# Patient Record
Sex: FEMALE | Race: WHITE | NOT HISPANIC OR LATINO | Employment: OTHER | URBAN - METROPOLITAN AREA
[De-identification: names, ages, dates, MRNs, and addresses within clinical notes are randomized per-mention and may not be internally consistent; named-entity substitution may affect disease eponyms.]

---

## 2023-06-15 ENCOUNTER — OFFICE VISIT (OUTPATIENT)
Dept: PRIMARY CARE | Facility: CLINIC | Age: 43
End: 2023-06-15
Payer: COMMERCIAL

## 2023-06-15 ENCOUNTER — TELEPHONE (OUTPATIENT)
Dept: PRIMARY CARE | Facility: CLINIC | Age: 43
End: 2023-06-15

## 2023-06-15 VITALS
HEART RATE: 87 BPM | SYSTOLIC BLOOD PRESSURE: 100 MMHG | DIASTOLIC BLOOD PRESSURE: 73 MMHG | WEIGHT: 225.2 LBS | OXYGEN SATURATION: 99 % | BODY MASS INDEX: 34.13 KG/M2 | HEIGHT: 68 IN

## 2023-06-15 DIAGNOSIS — K64.9 HEMORRHOIDS, UNSPECIFIED HEMORRHOID TYPE: ICD-10-CM

## 2023-06-15 DIAGNOSIS — L71.9 ROSACEA: ICD-10-CM

## 2023-06-15 DIAGNOSIS — E03.9 HYPOTHYROIDISM, UNSPECIFIED TYPE: ICD-10-CM

## 2023-06-15 DIAGNOSIS — K58.1 IRRITABLE BOWEL SYNDROME WITH PREDOMINANT CONSTIPATION: Primary | ICD-10-CM

## 2023-06-15 DIAGNOSIS — K59.04 CHRONIC IDIOPATHIC CONSTIPATION: ICD-10-CM

## 2023-06-15 DIAGNOSIS — K58.1 IRRITABLE BOWEL SYNDROME WITH CONSTIPATION: ICD-10-CM

## 2023-06-15 DIAGNOSIS — Z12.31 ENCOUNTER FOR SCREENING MAMMOGRAM FOR MALIGNANT NEOPLASM OF BREAST: ICD-10-CM

## 2023-06-15 PROBLEM — B37.31 CANDIDIASIS OF VAGINA: Status: ACTIVE | Noted: 2023-06-15

## 2023-06-15 PROBLEM — M62.89 PELVIC FLOOR DYSFUNCTION: Status: ACTIVE | Noted: 2023-06-15

## 2023-06-15 PROBLEM — B00.9 HSV-2 (HERPES SIMPLEX VIRUS 2) INFECTION: Status: ACTIVE | Noted: 2023-06-15

## 2023-06-15 PROBLEM — L65.9 HAIR LOSS: Status: ACTIVE | Noted: 2023-06-15

## 2023-06-15 PROBLEM — R68.82 LOW LIBIDO: Status: ACTIVE | Noted: 2023-06-15

## 2023-06-15 PROBLEM — F52.0 HYPOACTIVE SEXUAL DESIRE DISORDER: Status: ACTIVE | Noted: 2023-06-15

## 2023-06-15 PROBLEM — R11.0 NAUSEA IN ADULT: Status: ACTIVE | Noted: 2023-06-15

## 2023-06-15 PROBLEM — G47.00 INSOMNIA: Status: ACTIVE | Noted: 2023-06-15

## 2023-06-15 PROBLEM — N80.9 ENDOMETRIOSIS: Status: ACTIVE | Noted: 2023-06-15

## 2023-06-15 PROBLEM — M54.2 CERVICALGIA: Status: ACTIVE | Noted: 2023-06-15

## 2023-06-15 PROBLEM — G43.711 INTRACTABLE CHRONIC MIGRAINE WITHOUT AURA AND WITH STATUS MIGRAINOSUS: Status: ACTIVE | Noted: 2023-06-15

## 2023-06-15 PROBLEM — E21.3 HYPERPARATHYROIDISM (MULTI): Status: ACTIVE | Noted: 2023-06-15

## 2023-06-15 PROBLEM — E78.2 MIXED HYPERLIPIDEMIA: Status: ACTIVE | Noted: 2023-06-15

## 2023-06-15 PROBLEM — N76.0 RECURRENT VAGINITIS: Status: ACTIVE | Noted: 2023-06-15

## 2023-06-15 PROBLEM — K76.0 FATTY LIVER: Status: ACTIVE | Noted: 2023-06-15

## 2023-06-15 PROBLEM — F60.3 BORDERLINE PERSONALITY DISORDER (MULTI): Status: ACTIVE | Noted: 2023-06-15

## 2023-06-15 PROBLEM — E55.9 VITAMIN D DEFICIENCY: Status: ACTIVE | Noted: 2023-06-15

## 2023-06-15 PROBLEM — K64.4 EXTERNAL HEMORRHOIDS: Status: ACTIVE | Noted: 2018-05-03

## 2023-06-15 PROCEDURE — 99214 OFFICE O/P EST MOD 30 MIN: CPT | Performed by: NURSE PRACTITIONER

## 2023-06-15 PROCEDURE — 1036F TOBACCO NON-USER: CPT | Performed by: NURSE PRACTITIONER

## 2023-06-15 RX ORDER — ZOLPIDEM TARTRATE 10 MG/1
10 TABLET ORAL NIGHTLY PRN
COMMUNITY

## 2023-06-15 RX ORDER — FLUCONAZOLE 150 MG/1
TABLET ORAL
COMMUNITY
End: 2023-09-18 | Stop reason: ALTCHOICE

## 2023-06-15 RX ORDER — ACETAMINOPHEN 325 MG/1
TABLET ORAL EVERY 6 HOURS
COMMUNITY
Start: 2021-08-15

## 2023-06-15 RX ORDER — SUMATRIPTAN 50 MG/1
TABLET, FILM COATED ORAL
COMMUNITY

## 2023-06-15 RX ORDER — TOPIRAMATE 100 MG/1
100 TABLET, FILM COATED ORAL 2 TIMES DAILY
COMMUNITY
End: 2024-01-22 | Stop reason: SDUPTHER

## 2023-06-15 RX ORDER — ACETAMINOPHEN, ASPIRIN (NSAID), AND CAFFEINE 250; 250; 65 MG/1; MG/1; MG/1
TABLET, FILM COATED ORAL EVERY 6 HOURS PRN
COMMUNITY

## 2023-06-15 RX ORDER — VALACYCLOVIR HYDROCHLORIDE 500 MG/1
TABLET, FILM COATED ORAL
COMMUNITY
End: 2024-04-02 | Stop reason: SDUPTHER

## 2023-06-15 RX ORDER — CLONAZEPAM 0.5 MG/1
0.5 TABLET ORAL 3 TIMES DAILY PRN
COMMUNITY

## 2023-06-15 RX ORDER — ACETAMINOPHEN 500 MG
1 TABLET ORAL DAILY
COMMUNITY
Start: 2022-02-07

## 2023-06-15 RX ORDER — SERTRALINE HYDROCHLORIDE 100 MG/1
100 TABLET, FILM COATED ORAL
COMMUNITY

## 2023-06-15 RX ORDER — LAMOTRIGINE 150 MG/1
TABLET ORAL
COMMUNITY
Start: 2023-06-05

## 2023-06-15 RX ORDER — MELATONIN 10 MG
CAPSULE ORAL
COMMUNITY

## 2023-06-15 RX ORDER — ONDANSETRON HYDROCHLORIDE 8 MG/1
TABLET, FILM COATED ORAL
COMMUNITY
Start: 2020-02-17

## 2023-06-15 RX ORDER — TIZANIDINE 4 MG/1
4 TABLET ORAL NIGHTLY
COMMUNITY
End: 2023-11-28

## 2023-06-15 RX ORDER — LURASIDONE HYDROCHLORIDE 40 MG/1
TABLET, FILM COATED ORAL
COMMUNITY
Start: 2023-06-07

## 2023-06-15 RX ORDER — LEVOTHYROXINE SODIUM 50 UG/1
50 TABLET ORAL DAILY
COMMUNITY
End: 2023-06-15 | Stop reason: SDUPTHER

## 2023-06-15 RX ORDER — LEVOTHYROXINE SODIUM 50 UG/1
50 TABLET ORAL DAILY
Qty: 90 TABLET | Refills: 1 | Status: SHIPPED | OUTPATIENT
Start: 2023-06-15 | End: 2023-12-04

## 2023-06-15 NOTE — PROGRESS NOTES
"Subjective   Patient ID: Flower Guevara is a 42 y.o. female who presents for chronic care (Hemorrhoids binding?  /Rosacea.  ).    HPI     She did not have any improvement with her rosacea with metronidazole cream and brimonide was not covered by her insurance. She would like to see a dermatologist.     She had a hemorrhoidectomy in 2020 which helped, however, her hemorrhoids have returned. She reports occasional pain and rectal bleeding when she is constipated, which is frequent. She takes Linzess but only every couple days because it works \"too well\" and causes diarrhea. She has been taking a prebiotic and probiotic with little improvement. Has taken colace with little improvement.     She would like to see Dr. Bharti Melara (colorectal surgery) for her hemorrhoids for possible banding.         Review of Systems  ROS negative except as noted above in HPI.       Objective   /73   Pulse 87   Ht 1.727 m (5' 8\")   Wt 102 kg (225 lb 3.2 oz)   SpO2 99%   BMI 34.24 kg/m²     Physical Exam  General: Alert and oriented, in no acute distress. Appears stated age, well-nourished, and well hydrated  HEENT:  - Head: Normocephalic and atraumatic   - Eyes: EOMI, PERRLA  - ENT: Hearing grossly intact  Heart: RRR. No murmurs, clicks, or rubs  Lungs: Unlabored breathing. CTAB with no crackles, wheezes, or rhonchi  Abdomen: Normal BS in all 4 quadrants. Soft, non-tender, non-distended, with no masses  Extremities: Warm and well perfused. No edema. Normal peripheral pulses  Musculoskeletal: Normal gait and station  Neurological: Alert and oriented. No gross neurological deficits  Psychological: Appropriate mood and affect  Skin: No rash, abnormal lesions, cyanosis, or erythema    Assessment/Plan   Rosacea  - No improvement with metronidazole cream, brimonide not covered by insurance   - Referral placed for dermatology    Hemorrhoids  - Referral placed for colorectal surgery for possible banding    IBS-C  - Linzess causes " constipation  - Prescription sent for Trulance 3 mg every day (also given samples)  - Increase dietary fiber intake    Breast CA screening  - Mammogram ordered    RTC in 3-4 months or sooner prn    Reviewed and approved by PAZ MILLS on 6/15/23 at 2:57 PM.

## 2023-06-15 NOTE — TELEPHONE ENCOUNTER
Pt was started on trulance and the pharmacy told her it needed a PA- do you want to PA or change?

## 2023-06-20 ENCOUNTER — TELEPHONE (OUTPATIENT)
Dept: PRIMARY CARE | Facility: CLINIC | Age: 43
End: 2023-06-20
Payer: COMMERCIAL

## 2023-07-07 DIAGNOSIS — N63.10 MASS OF RIGHT BREAST, UNSPECIFIED QUADRANT: Primary | ICD-10-CM

## 2023-08-23 LAB
CLUE CELLS: NORMAL
NUGENT SCORE: 1
YEAST: NORMAL

## 2023-09-18 PROBLEM — M79.18 MYALGIA, OTHER SITE: Status: ACTIVE | Noted: 2023-09-18

## 2023-09-18 PROBLEM — R19.8 ABNORMAL BOWEL MOVEMENT: Status: ACTIVE | Noted: 2023-09-18

## 2023-09-18 PROBLEM — L53.9 ERYTHEMA OF VAGINA: Status: ACTIVE | Noted: 2023-09-18

## 2023-09-18 PROBLEM — E34.9 HORMONE IMBALANCE: Status: ACTIVE | Noted: 2023-09-18

## 2023-09-18 PROBLEM — R53.83 FATIGUE: Status: ACTIVE | Noted: 2023-09-18

## 2023-09-18 PROBLEM — H93.13 TINNITUS OF BOTH EARS: Status: ACTIVE | Noted: 2023-09-18

## 2023-09-18 PROBLEM — R07.1 INSPIRATORY PAIN: Status: ACTIVE | Noted: 2023-09-18

## 2023-09-18 PROBLEM — R74.01 TRANSAMINITIS: Status: ACTIVE | Noted: 2023-09-18

## 2023-09-18 PROBLEM — H92.09 OTALGIA: Status: ACTIVE | Noted: 2023-09-18

## 2023-09-18 PROBLEM — M99.09 SEGMENTAL AND SOMATIC DYSFUNCTION: Status: ACTIVE | Noted: 2023-09-18

## 2023-09-18 RX ORDER — TOBRAMYCIN AND DEXAMETHASONE 3; 1 MG/ML; MG/ML
SUSPENSION/ DROPS OPHTHALMIC
COMMUNITY
Start: 2023-07-18 | End: 2024-01-10 | Stop reason: ALTCHOICE

## 2023-09-18 RX ORDER — DEXAMETHASONE 2 MG/1
TABLET ORAL
COMMUNITY
Start: 2020-05-15 | End: 2024-01-10 | Stop reason: ALTCHOICE

## 2023-09-18 RX ORDER — BRIMONIDINE 5 MG/G
GEL TOPICAL
COMMUNITY
Start: 2023-02-16

## 2023-09-21 ENCOUNTER — OFFICE VISIT (OUTPATIENT)
Dept: PRIMARY CARE | Facility: CLINIC | Age: 43
End: 2023-09-21
Payer: COMMERCIAL

## 2023-09-21 VITALS
HEART RATE: 72 BPM | WEIGHT: 229.4 LBS | DIASTOLIC BLOOD PRESSURE: 76 MMHG | SYSTOLIC BLOOD PRESSURE: 108 MMHG | OXYGEN SATURATION: 99 % | BODY MASS INDEX: 34.88 KG/M2

## 2023-09-21 DIAGNOSIS — M77.11 LATERAL EPICONDYLITIS OF RIGHT ELBOW: Primary | ICD-10-CM

## 2023-09-21 PROBLEM — L53.9 ERYTHEMA OF VAGINA: Status: RESOLVED | Noted: 2023-09-18 | Resolved: 2023-09-21

## 2023-09-21 PROBLEM — M79.18 MYALGIA, OTHER SITE: Status: RESOLVED | Noted: 2023-09-18 | Resolved: 2023-09-21

## 2023-09-21 PROBLEM — B37.31 CANDIDIASIS OF VAGINA: Status: RESOLVED | Noted: 2023-06-15 | Resolved: 2023-09-21

## 2023-09-21 PROBLEM — H92.09 OTALGIA: Status: RESOLVED | Noted: 2023-09-18 | Resolved: 2023-09-21

## 2023-09-21 PROBLEM — R07.1 INSPIRATORY PAIN: Status: RESOLVED | Noted: 2023-09-18 | Resolved: 2023-09-21

## 2023-09-21 PROBLEM — R19.8 ABNORMAL BOWEL MOVEMENT: Status: RESOLVED | Noted: 2023-09-18 | Resolved: 2023-09-21

## 2023-09-21 PROCEDURE — 99213 OFFICE O/P EST LOW 20 MIN: CPT | Performed by: NURSE PRACTITIONER

## 2023-09-21 PROCEDURE — 1036F TOBACCO NON-USER: CPT | Performed by: NURSE PRACTITIONER

## 2023-09-21 RX ORDER — MELOXICAM 15 MG/1
15 TABLET ORAL DAILY
Qty: 7 TABLET | Refills: 0 | Status: SHIPPED | OUTPATIENT
Start: 2023-09-21 | End: 2023-09-28

## 2023-09-21 NOTE — PATIENT INSTRUCTIONS
Lateral Epicondylitis Exercises    About this topic  The elbow is where your upper arm bone meets the two lower bones in your arm. There is a bump on the outside of your elbow at the bottom of your upper arm bone. It is the lateral epicondyle. A few tendons attach here. Tendons attach muscles to bone. These muscles are used to pull your wrist and fingers up.  When these tendons get sore and swollen from overuse, you have lateral epicondylitis. Is also called tennis elbow. This is a common problem in tennis players. It may also happen with other activities or sports. You are more likely to have this problem in the arm you use most, but it can happen in either arm. Exercises can help to make this problem better.  General  Before starting with a program, ask your doctor if you are healthy enough to do these exercises. Your doctor may have you work with a  or physical therapist to make a safe exercise program to meet your needs.  Stretching Exercises  Stretching exercises keep your muscles flexible. They also stop them from getting tight. Start by doing each of these stretches 2 to 3 times. In order for your body to make changes, you will need to hold these stretches for 20 to 30 seconds. Try to do the stretches 2 to 3 times each day. Do all exercises slowly.  Wrist stretches bending down ? Straighten your elbow and have your palm facing down. Keeping your sore elbow straight, bend your hand and fingers down so that your fingers are now pointing to the floor. Grab your hand with your other hand and push back the wrist further until you feel a stretch.  Strengthening Exercises  Strengthening exercises keep your muscles firm and strong. Start by repeating each exercise 2 to 3 times. Work up to doing each exercise 10 times. Try to do the exercises 2 to 3 times each day. Do all exercises slowly.  Some exercises can be done holding a small weight. You can use a can of soup or a hand weight. If the exercise gets too  easy, use heavier weights or do the exercise more times.  Wrist exercises seated with your lower arm supported on a table or your leg. Your hand should be off the edge:  Bending up ? Have your palm facing UP with a small weight in your hand. Bend your wrist up as far as you can. Now, lower the weight back down. Be sure to control the weight as you lower it. Repeat using other hand.  Bending down ? Have your palm facing DOWN with a small weight in your hand. Bend your wrist back as far as you can. Now, lower the weight back down. Be sure to control the weight as you lower it. Repeat using other hand.  Side-to-side ? Position your hand SIDEWAYS with your thumb up. With a weight in your hand, lift your hand straight up. Now, lower your hand back down. Repeat using other hand.  Lower arm twists with weight ? Start by having your elbow bent with your arm at your side. Hold a small weight in your hand. Keeping your arm at your side and not moving your elbow, turn the lower arm until your palm is facing down. Now, turn the lower arm until your palm is facing up. Repeat using other hand.  Finger spreads with rubber band ? Find a thick rubber band. Straighten your fingers and bring them together so they are touching each other. Place the rubber band around your fingers and thumb as close to the nails as possible. Spread your fingers out as far as you can. Then, slowly bring them back to the starting position.  Ball squeezes ? Gently squeeze a tennis ball 10 times. If you have no pain, squeeze with more pressure.  Tennis strokes with exercise band ? Once your pain has lessened and you are almost ready to return to the tennis court, try these 3 exercises. You will need to exercise near a door or closet that can be opened and closed easily. Start with a thinner band and work your way up to a thicker band. These band exercises can help you with three tennis strokes:  Cora ? Secure an exercise band in a door at waist level.  Stand sideways with the hand you use the closest to the door. Grab the band with that hand. Pull the band across your body like you do in a candelaria motion.  Backhand ? Secure an exercise band in a door at waist level. Stand sideways with the hand you use the most away from the door. Reach toward the door and grab the band with this hand. Now, pull the band across your body like you do in a backhand motion.  Serve ? Secure an exercise band in a door at shoulder level. Stand facing away from the door. Grab the band with the hand you use the most. Start with your hand up and behind your head like you would for the start of a serve. Pull the band up and over like you are doing a serving motion.  Your doctor or therapist may also have you use a rubber bar or Flex bar for exercises to help your lateral epicondylitis.    What will the results be?  Less pain and swelling  Increased strength  Better range of motion  Greater ease doing arm activities  Helpful tips  Stay active and work out to keep your muscles strong and flexible.  Keep a healthy weight to avoid putting too much stress on your joints. Eat a healthy diet to keep your muscles healthy.  Be sure you do not hold your breath when exercising. This can raise your blood pressure. If you tend to hold your breath, try counting out loud when exercising. If any exercise bothers you, stop right away.  Always warm up before stretching. Heated muscles stretch much easier than cool muscles. Stretching cool muscles can lead to injury.  Try walking and swinging your arms at an easy pace for a few minutes to warm up your muscles. Do this again after exercising.  Never bounce when doing stretches.  Doing exercises before a meal may be a good way to get into a routine.  If you are using weights, choose a weight that will allow you to repeat the exercise 10 times before resting. If you easily do 10 repeats, you may not be using enough weight. If you are not able to do 10 repeats,  you are using too heavy of a weight.  Exercise may be slightly uncomfortable, but you should not have sharp pains. If you do get sharp pains, stop what you are doing. If the sharp pains continue, call your doctor.  Where can I learn more?  [employer]  https://orthoinfo.aaos.org/en/recovery/epicondylitis-therapeutic-exercise-program/  Last Reviewed Date  2021-08-03

## 2023-09-21 NOTE — PROGRESS NOTES
Subjective   Patient ID: Flower Guevara is a 43 y.o. female who presents for Arm Pain (Pt was seen in ER for R arm pain. Xray was completed. Pt stated in happened in July.).    HPI     [place] and was using a paddle. She developed pain in her right elbow at some point after that. She cannot remember exactly how soon after. The pain has been present since. She even went to the ED on 9/9 as the pain had gotten so bad. X-ray was done which was negative. Describes the pain as a sharp ache, worse with certain movements such as gripping things, brushing her hair, or opening a jar. She is left-handed but does do a lot of things with her right hand. She has tried ibuprofen, heat and ice application, wearing a counter force brace, and wearing a compression sleeve without relief.     Review of Systems  ROS negative except as noted above in HPI.       Objective   /76   Pulse 72   Wt 104 kg (229 lb 6.4 oz)   SpO2 99%   BMI 34.88 kg/m²     Physical Exam  General: Alert and oriented, in no acute distress. Appears stated age, well-nourished, and well hydrated  HEENT:  - Head: Normocephalic and atraumatic   - Eyes: EOMI, PERRLA  - ENT: Hearing grossly intact  Heart: RRR. No murmurs, clicks, or rubs  Lungs: Unlabored breathing. CTAB with no crackles, wheezes, or rhonchi  Abdomen: Normal BS in all 4 quadrants. Soft, non-tender, non-distended, with no masses  Extremities: Warm and well perfused. No edema. Normal peripheral pulses  Musculoskeletal: Localized tenderness over right lateral epicondyle. Pain with resisted wrist extension and pain with passive terminal wrist flexion. Normal gait and station  Neurological: Alert and oriented. No gross neurological deficits  Psychological: Appropriate mood and affect  Skin: No rash, abnormal lesions, cyanosis, or erythema      Assessment/Plan   Right lateral epicondylitis  - Rx meloxicam 15 mg every day for 7 days  - Recommend wearing  counter force brace  - Recommend icing 3-4 times per day  - Provided handout on exercises/stretched  - Consider orthopedics referral if no improvement    RTC as scheduled on 10/5/23    WINIFRED Petty-CNP  [facility]

## 2023-09-29 DIAGNOSIS — M77.11 LATERAL EPICONDYLITIS OF RIGHT ELBOW: Primary | ICD-10-CM

## 2023-10-01 DIAGNOSIS — K58.1 IRRITABLE BOWEL SYNDROME WITH PREDOMINANT CONSTIPATION: ICD-10-CM

## 2023-10-03 RX ORDER — PLECANATIDE 3 MG/1
TABLET ORAL DAILY
Qty: 90 TABLET | Refills: 0 | Status: SHIPPED | OUTPATIENT
Start: 2023-10-03 | End: 2023-12-22

## 2023-10-05 ENCOUNTER — OFFICE VISIT (OUTPATIENT)
Dept: ORTHOPEDIC SURGERY | Facility: HOSPITAL | Age: 43
End: 2023-10-05
Payer: COMMERCIAL

## 2023-10-05 ENCOUNTER — APPOINTMENT (OUTPATIENT)
Dept: PRIMARY CARE | Facility: CLINIC | Age: 43
End: 2023-10-05
Payer: COMMERCIAL

## 2023-10-05 DIAGNOSIS — M77.11 LATERAL EPICONDYLITIS OF RIGHT ELBOW: Primary | ICD-10-CM

## 2023-10-05 DIAGNOSIS — M25.521 ELBOW PAIN, RIGHT: ICD-10-CM

## 2023-10-05 PROCEDURE — 20551 NJX 1 TENDON ORIGIN/INSJ: CPT | Performed by: ORTHOPAEDIC SURGERY

## 2023-10-05 PROCEDURE — 1036F TOBACCO NON-USER: CPT | Performed by: ORTHOPAEDIC SURGERY

## 2023-10-05 PROCEDURE — 99213 OFFICE O/P EST LOW 20 MIN: CPT | Mod: 25 | Performed by: ORTHOPAEDIC SURGERY

## 2023-10-05 PROCEDURE — 99203 OFFICE O/P NEW LOW 30 MIN: CPT | Performed by: ORTHOPAEDIC SURGERY

## 2023-10-05 PROCEDURE — 20605 DRAIN/INJ JOINT/BURSA W/O US: CPT | Performed by: ORTHOPAEDIC SURGERY

## 2023-10-05 NOTE — LETTER
October 5, 2023     WINIFRED Stock-CNP   [address]  [facility] [address]    Patient: Flower Guevara   YOB: 1980   Date of Visit: 10/5/2023       Dear Dr. Kayli Camejo, WINIFRED-CNP:    Thank you for referring Flower Guevara to me for evaluation. Below are my notes for this consultation.  If you have questions, please do not hesitate to call me. I look forward to following your patient along with you.       Sincerely,     Nj Hurst MD      CC: No Recipients  ______________________________________________________________________________________    43-year-old is seen with lateral sided right elbow pain.  She has been having persistent severe sharp shooting pain since July.  She was doing repetitive paddling and noted the pain.  She has used meloxicam without relief.  She is done stretching exercises and use a tennis elbow band.  She is on disability for mental health related issues.  She is left-hand dominant.    Pleasant in no acute distress.  Right elbow range of motion is 5 to 130 degrees.  There is no effusion or instability.  There is tenderness over the lateral epicondyle.  There is no medial tenderness.  Mild tenderness along the left elbow lateral epicondyle.    Multiple x-ray views of the right elbow are personally reviewed and there is no acute bony abnormality.  No significant degenerative changes.    A detailed discussion about lateral epicondylitis was done.  Treatment options were reviewed and the decision was made to proceed with cortisone injection.  Under sterile conditions the left elbow lateral epicondyle was injected with 1-1/2 cc of Xylocaine and 1 cc of Kenalog 40 mg/cc.  She will perform formal physical therapy.  If she has persistent symptoms MRI could be obtained and she could potentially be Tenex candidate.

## 2023-10-05 NOTE — PROGRESS NOTES
43-year-old is seen with lateral sided right elbow pain.  She has been having persistent severe sharp shooting pain since July.  She was doing repetitive paddling and noted the pain.  She has used meloxicam without relief.  She is done stretching exercises and use a tennis elbow band.  She is on disability for mental health related issues.  She is left-hand dominant.    Pleasant in no acute distress.  Right elbow range of motion is 5 to 130 degrees.  There is no effusion or instability.  There is tenderness over the lateral epicondyle.  There is no medial tenderness.  Mild tenderness along the left elbow lateral epicondyle.    Multiple x-ray views of the right elbow are personally reviewed and there is no acute bony abnormality.  No significant degenerative changes.    A detailed discussion about lateral epicondylitis was done.  Treatment options were reviewed and the decision was made to proceed with cortisone injection.  Under sterile conditions the left elbow lateral epicondyle was injected with 1-1/2 cc of Xylocaine and 1 cc of Kenalog 40 mg/cc.  She will perform formal physical therapy.  If she has persistent symptoms MRI could be obtained and she could potentially be Tenex candidate.

## 2023-10-16 ENCOUNTER — HOSPITAL ENCOUNTER (EMERGENCY)
Facility: HOSPITAL | Age: 43
Discharge: LEFT BEFORE DISCHARGE | End: 2023-10-17
Attending: STUDENT IN AN ORGANIZED HEALTH CARE EDUCATION/TRAINING PROGRAM
Payer: COMMERCIAL

## 2023-10-16 VITALS
BODY MASS INDEX: 34.86 KG/M2 | DIASTOLIC BLOOD PRESSURE: 80 MMHG | HEIGHT: 68 IN | OXYGEN SATURATION: 97 % | SYSTOLIC BLOOD PRESSURE: 115 MMHG | RESPIRATION RATE: 20 BRPM | WEIGHT: 230 LBS | HEART RATE: 69 BPM | TEMPERATURE: 98.5 F

## 2023-10-16 DIAGNOSIS — R10.84 GENERALIZED ABDOMINAL PAIN: ICD-10-CM

## 2023-10-16 DIAGNOSIS — R51.9 NONINTRACTABLE HEADACHE, UNSPECIFIED CHRONICITY PATTERN, UNSPECIFIED HEADACHE TYPE: Primary | ICD-10-CM

## 2023-10-16 LAB
ALBUMIN SERPL BCP-MCNC: 4.6 G/DL (ref 3.4–5)
ALP SERPL-CCNC: 99 U/L (ref 33–110)
ALT SERPL W P-5'-P-CCNC: 19 U/L (ref 7–45)
ANION GAP SERPL CALC-SCNC: 13 MMOL/L (ref 10–20)
APPEARANCE UR: CLEAR
AST SERPL W P-5'-P-CCNC: 20 U/L (ref 9–39)
B-HCG SERPL-ACNC: <2 MIU/ML
BASOPHILS # BLD AUTO: 0.09 X10*3/UL (ref 0–0.1)
BASOPHILS NFR BLD AUTO: 0.8 %
BILIRUB SERPL-MCNC: 0.5 MG/DL (ref 0–1.2)
BILIRUB UR STRIP.AUTO-MCNC: NEGATIVE MG/DL
BUN SERPL-MCNC: 13 MG/DL (ref 6–23)
CALCIUM SERPL-MCNC: 9.4 MG/DL (ref 8.6–10.3)
CHLORIDE SERPL-SCNC: 103 MMOL/L (ref 98–107)
CO2 SERPL-SCNC: 25 MMOL/L (ref 21–32)
COLOR UR: YELLOW
CREAT SERPL-MCNC: 0.85 MG/DL (ref 0.5–1.05)
EOSINOPHIL # BLD AUTO: 0.22 X10*3/UL (ref 0–0.7)
EOSINOPHIL NFR BLD AUTO: 2 %
ERYTHROCYTE [DISTWIDTH] IN BLOOD BY AUTOMATED COUNT: 12.4 % (ref 11.5–14.5)
ERYTHROCYTE [SEDIMENTATION RATE] IN BLOOD BY WESTERGREN METHOD: 36 MM/H (ref 0–20)
GFR SERPL CREATININE-BSD FRML MDRD: 87 ML/MIN/1.73M*2
GLUCOSE SERPL-MCNC: 86 MG/DL (ref 74–99)
GLUCOSE UR STRIP.AUTO-MCNC: NEGATIVE MG/DL
HCT VFR BLD AUTO: 46.4 % (ref 36–46)
HGB BLD-MCNC: 15.8 G/DL (ref 12–16)
IMM GRANULOCYTES # BLD AUTO: 0.05 X10*3/UL (ref 0–0.7)
IMM GRANULOCYTES NFR BLD AUTO: 0.5 % (ref 0–0.9)
KETONES UR STRIP.AUTO-MCNC: NEGATIVE MG/DL
LACTATE SERPL-SCNC: 1 MMOL/L (ref 0.4–2)
LEUKOCYTE ESTERASE UR QL STRIP.AUTO: NEGATIVE
LIPASE SERPL-CCNC: 18 U/L (ref 9–82)
LYMPHOCYTES # BLD AUTO: 2.75 X10*3/UL (ref 1.2–4.8)
LYMPHOCYTES NFR BLD AUTO: 24.9 %
MCH RBC QN AUTO: 33.3 PG (ref 26–34)
MCHC RBC AUTO-ENTMCNC: 34.1 G/DL (ref 32–36)
MCV RBC AUTO: 98 FL (ref 80–100)
MONOCYTES # BLD AUTO: 0.73 X10*3/UL (ref 0.1–1)
MONOCYTES NFR BLD AUTO: 6.6 %
NEUTROPHILS # BLD AUTO: 7.2 X10*3/UL (ref 1.2–7.7)
NEUTROPHILS NFR BLD AUTO: 65.2 %
NITRITE UR QL STRIP.AUTO: NEGATIVE
NRBC BLD-RTO: 0 /100 WBCS (ref 0–0)
PH UR STRIP.AUTO: 5 [PH]
PLATELET # BLD AUTO: 342 X10*3/UL (ref 150–450)
PMV BLD AUTO: 9.5 FL (ref 7.5–11.5)
POTASSIUM SERPL-SCNC: 4.3 MMOL/L (ref 3.5–5.3)
PROT SERPL-MCNC: 8.2 G/DL (ref 6.4–8.2)
PROT UR STRIP.AUTO-MCNC: NEGATIVE MG/DL
RBC # BLD AUTO: 4.74 X10*6/UL (ref 4–5.2)
RBC # UR STRIP.AUTO: NEGATIVE /UL
SODIUM SERPL-SCNC: 137 MMOL/L (ref 136–145)
SP GR UR STRIP.AUTO: 1.02
UROBILINOGEN UR STRIP.AUTO-MCNC: <2 MG/DL
WBC # BLD AUTO: 11 X10*3/UL (ref 4.4–11.3)

## 2023-10-16 PROCEDURE — 81003 URINALYSIS AUTO W/O SCOPE: CPT | Performed by: EMERGENCY MEDICINE

## 2023-10-16 PROCEDURE — 96375 TX/PRO/DX INJ NEW DRUG ADDON: CPT

## 2023-10-16 PROCEDURE — 36415 COLL VENOUS BLD VENIPUNCTURE: CPT | Performed by: EMERGENCY MEDICINE

## 2023-10-16 PROCEDURE — 83690 ASSAY OF LIPASE: CPT

## 2023-10-16 PROCEDURE — 2500000004 HC RX 250 GENERAL PHARMACY W/ HCPCS (ALT 636 FOR OP/ED): Performed by: PHYSICIAN ASSISTANT

## 2023-10-16 PROCEDURE — 84702 CHORIONIC GONADOTROPIN TEST: CPT

## 2023-10-16 PROCEDURE — 85652 RBC SED RATE AUTOMATED: CPT

## 2023-10-16 PROCEDURE — 85025 COMPLETE CBC W/AUTO DIFF WBC: CPT | Performed by: EMERGENCY MEDICINE

## 2023-10-16 PROCEDURE — 83605 ASSAY OF LACTIC ACID: CPT | Performed by: EMERGENCY MEDICINE

## 2023-10-16 PROCEDURE — 99284 EMERGENCY DEPT VISIT MOD MDM: CPT | Mod: 25

## 2023-10-16 PROCEDURE — 74019 RADEX ABDOMEN 2 VIEWS: CPT | Mod: FOREIGN READ | Performed by: RADIOLOGY

## 2023-10-16 PROCEDURE — 2500000004 HC RX 250 GENERAL PHARMACY W/ HCPCS (ALT 636 FOR OP/ED)

## 2023-10-16 PROCEDURE — 96374 THER/PROPH/DIAG INJ IV PUSH: CPT

## 2023-10-16 PROCEDURE — 80053 COMPREHEN METABOLIC PANEL: CPT | Performed by: EMERGENCY MEDICINE

## 2023-10-16 RX ORDER — METOCLOPRAMIDE HYDROCHLORIDE 5 MG/ML
10 INJECTION INTRAMUSCULAR; INTRAVENOUS ONCE
Status: COMPLETED | OUTPATIENT
Start: 2023-10-16 | End: 2023-10-16

## 2023-10-16 RX ORDER — DOCUSATE SODIUM 100 MG/1
100 CAPSULE, LIQUID FILLED ORAL ONCE
Status: DISCONTINUED | OUTPATIENT
Start: 2023-10-16 | End: 2023-10-17 | Stop reason: HOSPADM

## 2023-10-16 RX ORDER — ONDANSETRON HYDROCHLORIDE 2 MG/ML
4 INJECTION, SOLUTION INTRAVENOUS ONCE
Status: COMPLETED | OUTPATIENT
Start: 2023-10-16 | End: 2023-10-16

## 2023-10-16 RX ADMIN — ONDANSETRON 4 MG: 2 INJECTION INTRAMUSCULAR; INTRAVENOUS at 21:38

## 2023-10-16 RX ADMIN — METOCLOPRAMIDE 10 MG: 5 INJECTION, SOLUTION INTRAMUSCULAR; INTRAVENOUS at 22:40

## 2023-10-16 ASSESSMENT — PAIN - FUNCTIONAL ASSESSMENT: PAIN_FUNCTIONAL_ASSESSMENT: 0-10

## 2023-10-16 ASSESSMENT — PAIN SCALES - GENERAL
PAINLEVEL_OUTOF10: 8
PAINLEVEL_OUTOF10: 10 - WORST POSSIBLE PAIN
PAINLEVEL_OUTOF10: 10 - WORST POSSIBLE PAIN

## 2023-10-16 ASSESSMENT — PAIN DESCRIPTION - LOCATION
LOCATION: ABDOMEN
LOCATION_2: HEAD

## 2023-10-16 ASSESSMENT — PAIN DESCRIPTION - DESCRIPTORS
DESCRIPTORS_2: ACHING
DESCRIPTORS: CRAMPING
DESCRIPTORS: CRAMPING

## 2023-10-17 NOTE — ED PROVIDER NOTES
HPI   Chief Complaint   Patient presents with    Abdominal Pain     Constipation and migraine       43-year-old female with past medical history of borderline personality disorder, depression, hyperparathyroidism, insomnia, anemia, RLS, migraines, thyroid disease presents the ED today for chief concern of abdominal pain.  Patient states that this started about 3 days ago.  She states that she has not had a bowel movement in 3 days.  Describes abdominal as diffuse and crampy.  It is located diffusely throughout the abdomen more so on her right side without any radiation.  She states that she has been trying Fleet enemas and MiraLAX at home with no relief in her symptoms.  She states that about an hour ago she developed a gradual onset right-sided headache.  States that she has photophobia and phonophobia as well.  She is not on oral contraceptive pills.  Is not the worst headache of her life.  She states that she has not taken any medicines for headache at home.  She states he has history of migraines and feels of her symptoms are similar.  She states that she has also had a hemorrhoidectomy in the past.  Endorses burning in the rectal area and bright red blood per stool.  She denies any fever/chills, nausea/vomiting, diarrhea, imbalance.  States she had similar symptoms in the past after she had her daughter.  She states that she is in the ER for rectal impaction at that time but she left without treatment being completed.  She has no other symptoms or concerns at this time.      History provided by:  Patient   used: No                        [place] Coma Scale Score: 15                  Patient History   Past Medical History:   Diagnosis Date    Borderline personality disorder (CMS/[facility]) 07/13/2020    Borderline personality disorder    Depression, unspecified 11/15/2022    Depression, unspecified depression type    Hyperparathyroidism, unspecified (CMS/[facility]) 11/06/2019    Hyperparathyroidism     Insomnia, unspecified 11/06/2019    Insomnia, unspecified type    Other specified personal risk factors, not elsewhere classified 11/06/2019    History of drug overdose    Personal history of diseases of the blood and blood-forming organs and certain disorders involving the immune mechanism     History of anemia    Personal history of other diseases of the digestive system 11/06/2019    History of irritable bowel syndrome    Personal history of other diseases of the digestive system 11/06/2019    History of hemorrhoids    Personal history of other diseases of the digestive system 11/06/2019    History of fatty infiltration of liver    Personal history of other diseases of the nervous system and sense organs 11/06/2019    History of restless legs syndrome    Personal history of other diseases of the nervous system and sense organs 11/06/2019    History of migraine    Personal history of other endocrine, nutritional and metabolic disease 11/06/2019    History of thyroid disease    Personal history of other infectious and parasitic diseases 11/07/2019    History of Helicobacter pylori infection    Personal history of other mental and behavioral disorders 11/06/2019    History of anxiety    Personal history of other mental and behavioral disorders 11/06/2019    History of suicidal ideation    Personal history of other mental and behavioral disorders     History of mental disorder    Personal history of other specified conditions     History of tachycardia    Personal history of other specified conditions 11/06/2019    History of headache    Personal history of other specified conditions     History of snoring     Past Surgical History:   Procedure Laterality Date    OTHER SURGICAL HISTORY  06/18/2019    Hysterectomy    OTHER SURGICAL HISTORY  01/12/2021    Hemorrhoidectomy    OTHER SURGICAL HISTORY  05/15/2020    Tonsillectomy    OTHER SURGICAL HISTORY  01/24/2020    Endoscopy     Family History   Problem Relation Name  Age of Onset    Anxiety disorder Mother      Depression Mother      Other (kidney stones) Mother      Anxiety disorder Father      Depression Father      Hypertension Father      Schizophrenia Other       Social History     Tobacco Use    Smoking status: Never    Smokeless tobacco: Never   Substance Use Topics    Alcohol use: Not Currently    Drug use: Never       Physical Exam   ED Triage Vitals   Temp Heart Rate Resp BP   10/16/23 2029 10/16/23 2029 10/16/23 2029 10/16/23 2029   36.9 °C (98.5 °F) 85 20 (!) 140/98      SpO2 Temp Source Heart Rate Source Patient Position   10/16/23 2029 10/16/23 2029 10/16/23 2339 10/16/23 2339   100 % Oral Monitor Lying      BP Location FiO2 (%)     10/16/23 2339 --     Right arm        Physical Exam  General: The pain the patient is sitting comfortably no acute distress.  Vital signs per nursing note.  Skin: No rashes, lesions, scars.  Normal skin turgor.  HEENT: The head is atraumatic normocephalic.  The neck is supple.  The trachea is midline.  No JVD.  5/5 strength in the neck.   No tenderness to palpation of the head.  Eyelashes and eyebrows are of normal quantity, distribution, color, and position bilaterally without lesions.  No enophthalmos or exophthalmos.  PERRLA, EOMI without nystagmus.  Negative raccoon eyes. External ear anatomy is normal.  TMs are white/gray and translucent.  Light reflex and bony landmarks are present.  No erythema, bulging, or retraction of the TM.  Negative cui sign.  Hearing is grossly intact.  No epistaxis or rhinorrhea.  Lips and buccal mucosa are pink and moist without lesions.  Tongue is midline without lesions.  Uvula is midline with symmetric elevation of the soft palate.  Normal phonation.  No hoarseness.  No muffled voice.  No tenderness over the temporal artery.  Lungs: Lungs are clear to auscultation bilaterally.  No rhonchi, wheezing, or rales.  No stridor.  Symmetric chest expansion  Heart: Normal S1-S2 no murmurs, rubs,  gallops.  Abdomen: Abdomen is tender to palpation diffusely throughout the abdomen.  No rebound tenderness or guarding.  No pulsatile mass.  Peripheral vascular: Symmetric 2+ radial and dorsalis pedis pulses.   Neurologic: Alert and oriented x4.  Thought process is coherent.   5/5 strength in the upper and lower extremity.  Sensation is intact in the upper and lower extremity.  2+ reflexes at the patellar tendon. Normal gait.  Rapid alternating motor movements are intact.  Musculoskeletal: No overlying skin changes throughout the entire back.  No C, T, L spine tenderness. Full ROM of the neck and back.   : Deferred  ED Course & MDM   ED Course as of 10/17/23 0048   Tue Oct 17, 2023   0044 CBC shows no evidence of leukocytosis or anemia.  CMP shows no MARCELA or acute liver injury.  Urinalysis unremarkable.  Beta-hCG unremarkable.  Lactate and lipase unremarkable. [MC]   0044 IMPRESSION:  The bowel pattern in the abdomen is unremarkable..   [MC]   0044 I did attempt to order CT head without contrast and CT abdomen pelvis with IV contrast however patient left AGAINST MEDICAL ADVICE prior to receiving these. [MC]      ED Course User Index  [MC] Rhys Aguilar PA-C         Diagnoses as of 10/17/23 0048   Nonintractable headache, unspecified chronicity pattern, unspecified headache type   Generalized abdominal pain       Medical Decision Making  43-year-old female with past medical history of borderline personality disorder, depression, hyperparathyroidism, insomnia, anemia, RLS, migraines, thyroid disease presents the ED today for chief concern of abdominal pain.  Vital signs are reassuring.  Patient overall appears well and is nontoxic-appearing.  She is fully neurologically intact with no acute neurological deficits.  She has full range of motion of the neck without any meningismus.  Patient was given metoclopramide and Zofran in the ED.  Discussed the differential at length with the patient and family. Patient is  awake, alert, fully oriented. Does not appear to be under the influence of any mind altering substances. Is logical in conversation and appears to be able to understand and manipulate the information presented to her. She stated that she understood all of the above but again would prefer to go home. Again, discussed the differential at length including such etiologies that could cause death, permanent disability, etc. Patient stated that she understood this. The patient was able to meaningfully discuss the risks/benefits/alternatives to treatment. Asked her that if her symptoms return or worsen anyway to return immediately to the ED for further evaluation. Patient stated that she understood this. However, at this time cannot convince her to stay. Will sign out AGAINST MEDICAL ADVICE.  I also ordered docusate, fleet enema, and IV fluids for this patient however I am not sure if these got administered prior to her leaving AGAINST MEDICAL ADVICE.    Differential diagnosis: Migraine, benign headache, venous sinus thrombosis, meningitis, SAH, ICH, constipation, acute intra-abdominal pathology, GYN pathology    Disposition/treatment  1.  Headache  2.  Abdominal pain    Patient left AGAINST MEDICAL ADVICE        Procedure  Procedures     Rhys Aguilar PA-C  10/17/23 0049

## 2023-10-17 NOTE — ED TRIAGE NOTES
Pt c/o constipation, hard stools, abd pain. Pt states she is concerned for bowel obstruction. States she has had one in the past and the symptoms are similar. Denies vomiting, but states nauseated. Last BM was Wednesday or Thursday of last week.     Pt also reports migraine headache as well starting today.

## 2023-11-06 ENCOUNTER — EVALUATION (OUTPATIENT)
Dept: OCCUPATIONAL THERAPY | Facility: CLINIC | Age: 43
End: 2023-11-06
Payer: COMMERCIAL

## 2023-11-06 DIAGNOSIS — M77.11 LATERAL EPICONDYLITIS OF RIGHT ELBOW: Primary | ICD-10-CM

## 2023-11-06 DIAGNOSIS — M25.521 ELBOW PAIN, RIGHT: ICD-10-CM

## 2023-11-06 DIAGNOSIS — M77.11 LATERAL EPICONDYLITIS OF RIGHT ELBOW: ICD-10-CM

## 2023-11-06 PROCEDURE — 97165 OT EVAL LOW COMPLEX 30 MIN: CPT | Mod: GO

## 2023-11-06 ASSESSMENT — ENCOUNTER SYMPTOMS
OCCASIONAL FEELINGS OF UNSTEADINESS: 0
LOSS OF SENSATION IN FEET: 0
DEPRESSION: 0

## 2023-11-06 NOTE — LETTER
November 7, 2023     Patient: Flower Guevara   YOB: 1980   Date of Visit: 11/6/2023       To Whom it May Concern:    Flower Guevara was seen in my clinic on 11/6/2023. She {Return to school/sport:27845}.    If you have any questions or concerns, please don't hesitate to call.         Sincerely,          Cindy Ulloa, OT        CC: No Recipients

## 2023-11-06 NOTE — LETTER
November 7, 2023     Patient: Flower Guevara   YOB: 1980   Date of Visit: 11/6/2023       To Whom It May Concern:    It is my medical opinion that Flower Guevara {Work release (duty restriction):22638}.    If you have any questions or concerns, please don't hesitate to call.         Sincerely,        Cindy Ulloa OT    CC: No Recipients

## 2023-11-07 NOTE — PROGRESS NOTES
Occupational Therapy Orthopedic Evaluation    Patient Name: Flower Guevara  MRN: [id number]  Today's Date: 11/6/2023       Insurance:  Visit number: 1  Authorization info: Auth required  Insurance Type: [employer]    General:  Reason for visit: Right elbow pain  Referred by: Dr. Nj Hurst MD     Assessment: Patient is a 46 yo female  s/p Right lateral epicondylitis  resulting in limited participation in pain-free ADLs and inability to perform at their prior level of function. Pt would benefit from occupational therapy to address the impairments found & listed previously in the objective section in order to return to safe and pain-free ADLs and prior level of function.       Plan:      Active       OT Goals       OT Goal 1       Start:  11/06/23    Expected End:  01/01/24       Patient to be independent  with HEP to further fxal progress.  Patient to be independent with pain reductions techs to use  Right  UE/hand for fxal activities with decreased pain levels to 3/10 or better.  Patient to be independent with conservative management principles of diagnosis, including splinting, home modalities and joint protection techs/UE lifting techniques for improved fxal use of UE for daily activities.   Patient to increase   strength to 65# for lift and carrying of household objects and grocery bags.   Patient to increase 3 pt pinch strength to 15# to  open/close, manipulate household objects and return to art/5D Lyn painting activities.   Patient to use right hand for feeding, grooming and ADLS with no difficulty and decreased pain levels.                Planned Interventions include: therapeutic exercise, therapeutic activity, self-care home management, manual therapy, therapeutic activities, gait training, neuromuscular coordination, vasopneumatic, dry needling, aquatic therapy, electric stimulation, fluidotherapy, ultrasound, kinesiotaping, orthosis fabrication, wound care  Frequency: 1-2 x Week  Duration: 4  Weeks    Current Problem Right lateral epicondylitis      Medical History Form: Reviewed (scanned into chart)  Diagnoses pertinent to therapy: PTSD, Bipolar with depression, Migraines, Insomnia     Subjective:   Patient reports onset of right lateral elbow and forearm pain in July of 2023, when she went tubing for approx four hours and then increased vigorous paddling for a period of time due to start of inclement weather. Patient wearing a counter force elbow strap at time of eval. She did receive a cortisone injection at lateral elbow, however reports no relief of pain/symptoms.     Chief complaints/concerns from patient/family member:   Increased pain and difficulty with fxal lifting, carrying laundry basket. Max difficulty wit meal prep. Increased use of non dominant hand for daily tasks.     JOSEPHINE: See above   Date of onset: July 7-9, 2023  Date of surgery: NA       Hand Dominance: Ambidextrous    PAIN:   Right lateral elbow , epicondyle radiating to extensor muscle belly.  At rest: 0/10  Fxal use/movement: 6/10  Worst pain: 8/10 when attempting to     Description/Type:  Strong ache, frequent sharp  Aggravating Factors: Lifting/Carrying  Relieving Factors:  Intermittent use of CP and Advil     Relevant Information (PMH & Previous Tests/Imaging):   Previous Interventions/Treatments: Injections    Prior Level of Function (PLOF)  Previous ADL/IADL Status: Independent   Work: Disability   Leisure/Hobbies: Lyn painting     Patients Living Environment: Reviewed and no concern    Primary Language: English    Pt goals for therapy: Decrease pain     Red Flags: Do you have any of the following? No  Fever/chills, unexplained weight changes, dizziness/fainting, unexplained change in bowel or bladder functions, unexplained malaise or muscle weakness, night pain/sweats, numbness or tingling    Objective:  AROM: Right WFL/WNL. Pain reported with elbow flexion and forearm rotation    Hand Strength Measures   R L    Dynamometer  45  (8/10 pain reports) 73   Lateral Pinch 16 15   3jaw Pinch  Tip Pinch  11  9 15  9      MMT UE: NE due to pain levels  Edema: None  Numbness/Tingling: No complaints     Outcome Measures:  Quick Dash 77.27%    EDUCATION: home exercise program, plan of care, activity modifications, pain management, and injury pathology      Treatments:   OT evaluation completed and HEP issued.   Patient education on rationale, benefits and timing of MH, cross friction massage and CP use to decrease symptoms.  Education on proper placement of elbow strap to be worn during the day only. OT recommend a wrist prefab for night wear to allow extensor tendon rest at elbow.    AROM wrist flex/extension elbow flexed . Gentle PROM of wrist extensors (elbow flexed)  Brief discussion of safe UE fxal lifting principles.  Written and illustrated handouts issued to patient.    Therex:     Manual:      Modalities:      Orthosis:      Therapeutic Activity:      Wound Care:     Neuro Re-ed:      Self-Care:     Other Treatment:     Cindy Ulloa MS, OTR/L [id number]

## 2023-11-14 ENCOUNTER — APPOINTMENT (OUTPATIENT)
Dept: OCCUPATIONAL THERAPY | Facility: CLINIC | Age: 43
End: 2023-11-14
Payer: COMMERCIAL

## 2023-11-16 ENCOUNTER — TREATMENT (OUTPATIENT)
Dept: OCCUPATIONAL THERAPY | Facility: CLINIC | Age: 43
End: 2023-11-16
Payer: COMMERCIAL

## 2023-11-16 DIAGNOSIS — M77.11 LATERAL EPICONDYLITIS OF RIGHT ELBOW: Primary | ICD-10-CM

## 2023-11-16 PROCEDURE — 97140 MANUAL THERAPY 1/> REGIONS: CPT | Mod: GO

## 2023-11-16 PROCEDURE — 97035 APP MDLTY 1+ULTRASOUND EA 15: CPT | Mod: GO

## 2023-11-16 NOTE — PROGRESS NOTES
"Occupational Therapy Treatment    Patient Name: Flower Guevara  MRN: [id number]  Today's Date: 11/16/2023       Insurance:  Visit number: 2/ (1)  Authorization info: 16 approved  Insurance Type: [employer]  Dates 11/7/23 to 12/31/23    Current Problem  1. Lateral epicondylitis of right elbow          Precautions     Subjective:   \"Still hurts to  and hold things.\" Pt reports compliance with elbow strap and wrist brace at night.    Pain:   6  /10  Location: Right wrist extensor muscle belly  Description: Pt did not state    Performing HEP?: Yes  Objective:     Treatment:    Modalities:  10 min  Ultrasound tx:  3 MHZ  1.4 w/cm2 Continuous cycle over Right wrist extensor muscle belly x 10 min      Therapeutic Exercise:    min      Manual Therapy:  30  min  Right forearm DTM wrist flexors/extensors. Trigger point release   Gentle PROM elbow extension, supination/pronation w/ wrist extension    Therapeutic Activity:     min      Neuromuscular Re-education:  min      Orthosis:   min      Wound Care:     min      Self Care:    min      Other Treatment:   min      Assessment:  Slight decrease in pain levels since initial evaluation. Significant tightness and presence of trigger points throughout forearm musculature.   Good compliance with elbow strap and wrist brace to allow tendon rest/healing.     Plan:   Continue with POC.     iCndy Ulloa MS, OTR/L [id number]         "

## 2023-11-20 ENCOUNTER — TREATMENT (OUTPATIENT)
Dept: OCCUPATIONAL THERAPY | Facility: CLINIC | Age: 43
End: 2023-11-20
Payer: COMMERCIAL

## 2023-11-20 DIAGNOSIS — M77.11 LATERAL EPICONDYLITIS OF RIGHT ELBOW: Primary | ICD-10-CM

## 2023-11-22 ENCOUNTER — APPOINTMENT (OUTPATIENT)
Dept: OCCUPATIONAL THERAPY | Facility: CLINIC | Age: 43
End: 2023-11-22
Payer: COMMERCIAL

## 2023-11-22 DIAGNOSIS — E66.9 OBESITY (BMI 30.0-34.9): Primary | ICD-10-CM

## 2023-11-27 DIAGNOSIS — M54.2 CERVICALGIA: Primary | ICD-10-CM

## 2023-11-28 ENCOUNTER — APPOINTMENT (OUTPATIENT)
Dept: OCCUPATIONAL THERAPY | Facility: CLINIC | Age: 43
End: 2023-11-28
Payer: COMMERCIAL

## 2023-11-28 RX ORDER — TIZANIDINE 4 MG/1
4 TABLET ORAL NIGHTLY
Qty: 30 TABLET | Refills: 1 | Status: SHIPPED | OUTPATIENT
Start: 2023-11-28 | End: 2024-01-26 | Stop reason: SDUPTHER

## 2023-11-30 ENCOUNTER — APPOINTMENT (OUTPATIENT)
Dept: ORTHOPEDIC SURGERY | Facility: HOSPITAL | Age: 43
End: 2023-11-30
Payer: COMMERCIAL

## 2023-11-30 ENCOUNTER — APPOINTMENT (OUTPATIENT)
Dept: OCCUPATIONAL THERAPY | Facility: CLINIC | Age: 43
End: 2023-11-30
Payer: COMMERCIAL

## 2023-12-12 ENCOUNTER — HOSPITAL ENCOUNTER (OUTPATIENT)
Dept: RADIOLOGY | Facility: HOSPITAL | Age: 43
Discharge: HOME | End: 2023-12-12
Payer: COMMERCIAL

## 2023-12-12 DIAGNOSIS — M25.521 ELBOW PAIN, RIGHT: ICD-10-CM

## 2023-12-12 DIAGNOSIS — M77.11 LATERAL EPICONDYLITIS OF RIGHT ELBOW: ICD-10-CM

## 2023-12-12 PROCEDURE — 73221 MRI JOINT UPR EXTREM W/O DYE: CPT | Mod: RIGHT SIDE | Performed by: RADIOLOGY

## 2023-12-12 PROCEDURE — 73221 MRI JOINT UPR EXTREM W/O DYE: CPT | Mod: RT

## 2023-12-15 ENCOUNTER — TELEPHONE (OUTPATIENT)
Dept: SURGERY | Facility: CLINIC | Age: 43
End: 2023-12-15

## 2023-12-15 ENCOUNTER — OFFICE VISIT (OUTPATIENT)
Dept: ORTHOPEDIC SURGERY | Facility: CLINIC | Age: 43
End: 2023-12-15
Payer: COMMERCIAL

## 2023-12-15 VITALS — WEIGHT: 230 LBS | BODY MASS INDEX: 34.86 KG/M2 | HEIGHT: 68 IN

## 2023-12-15 DIAGNOSIS — M77.11 LATERAL EPICONDYLITIS OF RIGHT ELBOW: Primary | ICD-10-CM

## 2023-12-15 DIAGNOSIS — M77.11 LATERAL EPICONDYLITIS, RIGHT ELBOW: ICD-10-CM

## 2023-12-15 PROCEDURE — 99214 OFFICE O/P EST MOD 30 MIN: CPT | Performed by: FAMILY MEDICINE

## 2023-12-15 PROCEDURE — 1036F TOBACCO NON-USER: CPT | Performed by: FAMILY MEDICINE

## 2023-12-15 NOTE — PROGRESS NOTES
History of Present Illness   Chief Complaint   Patient presents with    Other     DISCUSS MRI       The patient is 43 y.o. female  here with a complaint of right elbow pain.  Onset of symptoms 5 months ago, says symptoms started after she was paddling in a boat, symptoms started after this.  Pain over the lateral elbow into the proximal forearm.  She was seen by Dr. Hurst in October of this year, had ongoing symptoms despite stretching, tennis elbow strap.  He referred her to occupational therapy as well as proceeded with Kenalog injection at the lateral elbow, she says that she has had minimal improvement following injection, felt like therapy was making symptoms worse, she did recently have an MRI that showed some mild tendinosis of the common extensor tendon, Dr. Hurst recommended follow-up with myself for consideration of percutaneous tenotomy.  She has pain with day-to-day activity, lifting things, she admits to pain with gripping things.  She denies any numbness or tingling.    Past Medical History:   Diagnosis Date    Borderline personality disorder (CMS/[facility]) 07/13/2020    Borderline personality disorder    Depression, unspecified 11/15/2022    Depression, unspecified depression type    Hyperparathyroidism, unspecified (CMS/[facility]) 11/06/2019    Hyperparathyroidism    Insomnia, unspecified 11/06/2019    Insomnia, unspecified type    Other specified personal risk factors, not elsewhere classified 11/06/2019    History of drug overdose    Personal history of diseases of the blood and blood-forming organs and certain disorders involving the immune mechanism     History of anemia    Personal history of other diseases of the digestive system 11/06/2019    History of irritable bowel syndrome    Personal history of other diseases of the digestive system 11/06/2019    History of hemorrhoids    Personal history of other diseases of the digestive system 11/06/2019    History of fatty infiltration of liver    Personal  history of other diseases of the nervous system and sense organs 11/06/2019    History of restless legs syndrome    Personal history of other diseases of the nervous system and sense organs 11/06/2019    History of migraine    Personal history of other endocrine, nutritional and metabolic disease 11/06/2019    History of thyroid disease    Personal history of other infectious and parasitic diseases 11/07/2019    History of Helicobacter pylori infection    Personal history of other mental and behavioral disorders 11/06/2019    History of anxiety    Personal history of other mental and behavioral disorders 11/06/2019    History of suicidal ideation    Personal history of other mental and behavioral disorders     History of mental disorder    Personal history of other specified conditions     History of tachycardia    Personal history of other specified conditions 11/06/2019    History of headache    Personal history of other specified conditions     History of snoring       Medication Documentation Review Audit       Reviewed by Maribell Barajas MA (Medical Assistant) on 11/28/23 at 1529      Medication Order Taking? Sig Documenting Provider Last Dose Status   acetaminophen (Tylenol) 325 mg tablet [id number]  Take by mouth every 6 hours. Historical MD Andra  Active   aspirin-acetaminophen-caffeine (Excedrin Extra Strength) 250-250-65 mg tablet [id number]  Take by mouth every 6 hours if needed. Historical Provider, MD  Active   brimonidine 0.33 % gel with pump [id number]  Apply a pea-sized amount to face once daily Historical Provider, MD  Active   cholecalciferol (Vitamin D-3) 50 mcg (2,000 unit) capsule [id number]  Take 1 capsule (50 mcg) by mouth once daily. Historical Provider, MD  Active   clonazePAM (KlonoPIN) 0.5 mg tablet [id number]  Take 1 tablet (0.5 mg) by mouth 3 times a day as needed. Dg Provider, MD  Active   dexAMETHasone (Decadron) 2 mg tablet [id number]  Take by mouth. Historical MD Andra   Active   L. acidophilus/Bifid. animalis 32 billion cell capsule [id number]  Take by mouth. Historical Provider, MD  Active   lamoTRIgine (LaMICtal) 150 mg tablet [id number]  TAKE 2 TABLETS BY MOUTH AT BEDTIME. TOTAL DOSE 300MG Historical MD Andra  Active   levothyroxine (Synthroid, Levoxyl) 50 mcg tablet [id number]  Take 1 tablet (50 mcg) by mouth once daily. Kayli Camejo APRN-CNP  Active   lurasidone (Latuda) 40 mg tablet [id number]  TAKE 1 TABLET BY MOUTH IN THE EVENING WITH FOOD Historical MD Andra  Active   melatonin 10 mg capsule [id number]  Take by mouth. Dg Silverman MD  Active   ondansetron (Zofran) 8 mg tablet [id number]  Take by mouth. Historical Provider, MD  Active   sertraline (Zoloft) 100 mg tablet [id number]  Take 1 tablet (100 mg) by mouth once daily in the morning. Take before meals. Dg Silverman MD  Active   SUMAtriptan (Imitrex) 50 mg tablet [id number]  TAKE ONE TABLET BY MOUTH AT THE ONSET OF HEADACHE, CAN REPEAT IN 2 HOURS. MAX 4 TABLETS IN 1 DAYS Dg Silverman MD  Active   tiZANidine (Zanaflex) 4 mg tablet [id number]  Take 1 tablet (4 mg) by mouth once daily at bedtime. Historical Provider, MD  Active   tobramycin-dexamethasone (Tobradex) ophthalmic suspension [id number]  INSTILL 1 DROP INTO LEFT EYE 4 TIMES DAILY FOR 7 DAYS Dg Silverman MD  Active   topiramate (Topamax) 100 mg tablet [id number]  Take 1 tablet (100 mg) by mouth 2 times a day. Dg Silverman MD  Active   Trulance tablet tablet [id number]  Take 1 tablet by mouth once daily WINIFRED Stock-CNP  Active   valACYclovir (Valtrex) 500 mg tablet [id number]  TAKE 1 TABLET BY MOUTH ONCE DAILY DURING OUTBREAKS THEN TAKE 1 TABLET TWICE DAILY FOR 3 DAYS Dg Silverman MD  Active   zolpidem (Ambien) 10 mg tablet [id number]  Take 1 tablet (10 mg) by mouth as needed at bedtime. Historical Provider, MD  Active                    Allergies   Allergen Reactions    Hydromorphone Itching       Social History      Socioeconomic History    Marital status: Significant Other     Spouse name: Not on file    Number of children: Not on file    Years of education: Not on file    Highest education level: Not on file   Occupational History    Not on file   Tobacco Use    Smoking status: Never    Smokeless tobacco: Never   Substance and Sexual Activity    Alcohol use: Not Currently    Drug use: Never    Sexual activity: Not on file   Other Topics Concern    Not on file   Social History Narrative    Not on file     Social Determinants of Health     Financial Resource Strain: Not on file   Food Insecurity: Not on file   Transportation Needs: Not on file   Physical Activity: Not on file   Stress: Not on file   Social Connections: Not on file   Intimate Partner Violence: Not on file   Housing Stability: Not on file       Past Surgical History:   Procedure Laterality Date    OTHER SURGICAL HISTORY  06/18/2019    Hysterectomy    OTHER SURGICAL HISTORY  01/12/2021    Hemorrhoidectomy    OTHER SURGICAL HISTORY  05/15/2020    Tonsillectomy    OTHER SURGICAL HISTORY  01/24/2020    Endoscopy          Review of Systems   GENERAL: Negative  GI: Negative  MUSCULOSKELETAL: See HPI  SKIN: Negative  NEURO:  Negative     Physical Exam:    General/Constitutional: well appearing, no distress, appears stated age  HEENT: sclera clear  Respiratory: non labored breathing  Vascular: No edema, swelling or tenderness, except as noted in detailed exam.  Integumentary: No impressive skin lesions present, except as noted in detailed exam.  Neurological:  Alert and oriented   Psychological:  Normal mood and affect.  Musculoskeletal: Normal, except as noted in detailed exam and in HPI    Right elbow: Normal appearance, no skin changes, no redness or warmth.  Range of motion from 0 to 130 degrees, she has tenderness to the lateral epicondyle as well as at the proximal extensor compartment muscle belly.  She has exacerbation of pain with resisted wrist and third  finger extension.       Imaging: MRI of right elbow was reviewed, there is some mild tendinosis of the common extensor tendon, no other abnormalities are seen      Assessment   1. Lateral epicondylitis of right elbow        2. Lateral epicondylitis, right elbow  Case Request Operating Room: Tenotomy Upper Extremity    Case Request Operating Room: Tenotomy Upper Extremity            Plan: Discussed diagnosis, further workup and treatment.  She is on ongoing symptoms for the past 5 months despite conservative management including home exercise program, counterforce brace, corticosteroid injection, formal occupational therapy that made symptoms slightly worse.  Discussed alternative treatment options, she was interested in pursuing percutaneous tenotomy with Tenjet.  Risks and benefits of procedure were discussed with patient, consent was obtained in the office today, she will call my surgery scheduler to schedule this procedure

## 2023-12-15 NOTE — H&P (VIEW-ONLY)
History Of Present Illness  Flower Guevara is a 43 y.o. female presenting with ongoing right elbow pain for the past 5 months, symptoms consistent with lateral epicondylitis, pain at the lateral elbow into the forearm, ongoing symptoms despite conservative management with bracing, medications, injection, home exercise and formal occupational therapy.  Recent MRI showed mild tendinosis of common extensor tendon, she is interested in alternative/more definitive treatment management.     Past Medical History  She has a past medical history of Borderline personality disorder (CMS/HCC) (07/13/2020), Depression, unspecified (11/15/2022), Hyperparathyroidism, unspecified (CMS/HCC) (11/06/2019), Insomnia, unspecified (11/06/2019), Other specified personal risk factors, not elsewhere classified (11/06/2019), Personal history of diseases of the blood and blood-forming organs and certain disorders involving the immune mechanism, Personal history of other diseases of the digestive system (11/06/2019), Personal history of other diseases of the digestive system (11/06/2019), Personal history of other diseases of the digestive system (11/06/2019), Personal history of other diseases of the nervous system and sense organs (11/06/2019), Personal history of other diseases of the nervous system and sense organs (11/06/2019), Personal history of other endocrine, nutritional and metabolic disease (11/06/2019), Personal history of other infectious and parasitic diseases (11/07/2019), Personal history of other mental and behavioral disorders (11/06/2019), Personal history of other mental and behavioral disorders (11/06/2019), Personal history of other mental and behavioral disorders, Personal history of other specified conditions, Personal history of other specified conditions (11/06/2019), and Personal history of other specified conditions.    Surgical History  She has a past surgical history that includes Other surgical history  "(06/18/2019); Other surgical history (01/12/2021); Other surgical history (05/15/2020); and Other surgical history (01/24/2020).     Social History  She reports that she has never smoked. She has never used smokeless tobacco. She reports that she does not currently use alcohol. She reports that she does not use drugs.    Family History  Family History   Problem Relation Name Age of Onset    Anxiety disorder Mother      Depression Mother      Other (kidney stones) Mother      Anxiety disorder Father      Depression Father      Hypertension Father      Schizophrenia Other          Allergies  Hydromorphone    Review of Systems    Review of Systems   GENERAL: Negative  GI: Negative  MUSCULOSKELETAL: See HPI  SKIN: Negative  NEURO:  Negative     Physical Exam    General/Constitutional: well appearing, no distress, appears stated age  HEENT: sclera clear  Respiratory: non labored breathing  Vascular: No edema, swelling or tenderness, except as noted in detailed exam.  Integumentary: No impressive skin lesions present, except as noted in detailed exam.  Neurological:  Alert and oriented   Psychological:  Normal mood and affect.  Musculoskeletal: Normal, except as noted in detailed exam and in HPI     Right elbow: Normal appearance, no skin changes, no redness or warmth.  Range of motion from 0 to 130 degrees, she has tenderness to the lateral epicondyle as well as at the proximal extensor compartment muscle belly.  She has exacerbation of pain with resisted wrist and third finger extension.     Last Recorded Vitals  Height 1.727 m (5' 8\"), weight 104 kg (230 lb).    Relevant Results      Scheduled medications    Continuous medications    PRN medications    No results found for this or any previous visit (from the past 24 hour(s)).      Assessment/Plan   Diagnoses and all orders for this visit:  Lateral epicondylitis of right elbow  Lateral epicondylitis, right elbow  -     Case Request Operating Room: Tenotomy Upper " Extremity; Standing  Other orders  -     Place in outpatient/hospital ambulatory surgery; Standing      Right lateral epicondylitis refractory to conservative management.  She was interested in pursuing more definitive management with percutaneous tenotomy.  Patient was consented for procedure, understand risk and benefits of procedure including potential for ongoing pain despite procedure.       I spent 30 minutes in the professional and overall care of this patient.      Ralph Chowdary MD

## 2023-12-18 PROBLEM — M77.11 LATERAL EPICONDYLITIS, RIGHT ELBOW: Status: ACTIVE | Noted: 2023-12-15

## 2023-12-19 DIAGNOSIS — Z98.84 BARIATRIC SURGERY STATUS: Primary | ICD-10-CM

## 2023-12-20 ENCOUNTER — TELEPHONE (OUTPATIENT)
Dept: SURGERY | Facility: CLINIC | Age: 43
End: 2023-12-20
Payer: COMMERCIAL

## 2023-12-20 ENCOUNTER — NUTRITION (OUTPATIENT)
Dept: SURGERY | Facility: CLINIC | Age: 43
End: 2023-12-20
Payer: COMMERCIAL

## 2023-12-20 NOTE — PROGRESS NOTES
PREOPERATIVE, MULTIDISCIPLINARY, MEDICALLY SUPERVISED, REDUCED CALORIE DIET, BEHAVIOR MODIFICATION AND EXERCISE PROGRAM    S:      O:    Wt: 236lbs    Goal: 5% body weight loss over the course of program    Dietary recommendation:   1. Eliminate high calorie, carbonated, and caffeinated beverages  2. Practice the 30-30-30 rule by drinking between meals.  3. Structure your meal plan - have 3 meals and 1 snack daily.  4. Have balanced meals that always contain a good source of protein.  5. Increase intake of non-starchy vegetables.  Have 5 servings fruits and vegetables daily.   6. Take a multivitamin daily.  7. Increase physical activity by 10-15 minutes to an end goal of 60 minutes 5 x per week.    Group Topic: Emotional Eating    Behavioral recommendation: Pt is encouraged to practice identifying the differences between emotional hunger and physical hunger and to implement distraction and confrontation techniques to avoid emotional eating.     A/P: Pt appears to have a good understanding of how to characterize the two different types of hunger.  Pt has a goal to keep a food diary and to utilize at least two of the techniques learned in class to deter emotional eating.    Exercise: house work and occasional walking 1-3x per week for 30 minutes     Kathya Garcia RDN, LD

## 2023-12-22 ENCOUNTER — TELEPHONE (OUTPATIENT)
Dept: SURGERY | Facility: CLINIC | Age: 43
End: 2023-12-22
Payer: COMMERCIAL

## 2023-12-22 DIAGNOSIS — K58.1 IRRITABLE BOWEL SYNDROME WITH PREDOMINANT CONSTIPATION: ICD-10-CM

## 2023-12-22 RX ORDER — PLECANATIDE 3 MG/1
TABLET ORAL DAILY
Qty: 90 TABLET | Refills: 0 | Status: SHIPPED | OUTPATIENT
Start: 2023-12-22

## 2023-12-29 PROBLEM — Z98.84 BARIATRIC SURGERY STATUS: Status: ACTIVE | Noted: 2023-12-29

## 2023-12-29 NOTE — PROGRESS NOTES
"Subjective     Date: 12/29/2023 Time: 2:57 PM  Name: Flower Guevara  MRN: [id number]    This is a 43 y.o. female with morbid obesity (There is no height or weight on file to calculate BMI.) who presents to clinic for consideration of bariatric surgery. she has attempted and failed multiple diet and exercise regimens for weight loss. Initial Onset of obesity was {Initial Obesity Onset:35531:::0}.  Their goal for surgery is to  {Weight Loss Interest:59886}. The patient has tried multiple diets to lose weight including {Previous Diet Trials:56283}. The patient was most successful with the {Most Successful Diet:20754}. The most pounds lost on this diet were *** lbs. The patient considers their dietary weakness to be {Dietary Weakness:73821} The patient reports a  {Weight Pattern:20160::\"highest weight ever of *** pounds\",\"lowest weight ever of *** pounds\"} {Distribution of Obesity:27476}. Current diet: {Diet:16319}. Compliance: {Compliance:80790} Diet Problems: {Diet Problems:47169} } Dietary Details Include:{Dietary Details:07071} The patient {Exercise Frequency:78385} {Excercise Duration (Optional):89124} {Types of Exercise (Optional):81978}    {Comorbidities:58828}    {Procedure Preferred:12215}    {GERDQOL:89511}    PMH:   Past Medical History:   Diagnosis Date    Borderline personality disorder (CMS/[facility]) 07/13/2020    Borderline personality disorder    Depression, unspecified 11/15/2022    Depression, unspecified depression type    Hyperparathyroidism, unspecified (CMS/[facility]) 11/06/2019    Hyperparathyroidism    Insomnia, unspecified 11/06/2019    Insomnia, unspecified type    Other specified personal risk factors, not elsewhere classified 11/06/2019    History of drug overdose    Personal history of diseases of the blood and blood-forming organs and certain disorders involving the immune mechanism     History of anemia    Personal history of other diseases of the digestive system 11/06/2019    History of irritable " bowel syndrome    Personal history of other diseases of the digestive system 11/06/2019    History of hemorrhoids    Personal history of other diseases of the digestive system 11/06/2019    History of fatty infiltration of liver    Personal history of other diseases of the nervous system and sense organs 11/06/2019    History of restless legs syndrome    Personal history of other diseases of the nervous system and sense organs 11/06/2019    History of migraine    Personal history of other endocrine, nutritional and metabolic disease 11/06/2019    History of thyroid disease    Personal history of other infectious and parasitic diseases 11/07/2019    History of Helicobacter pylori infection    Personal history of other mental and behavioral disorders 11/06/2019    History of anxiety    Personal history of other mental and behavioral disorders 11/06/2019    History of suicidal ideation    Personal history of other mental and behavioral disorders     History of mental disorder    Personal history of other specified conditions     History of tachycardia    Personal history of other specified conditions 11/06/2019    History of headache    Personal history of other specified conditions     History of snoring        PSH:   Past Surgical History:   Procedure Laterality Date    OTHER SURGICAL HISTORY  06/18/2019    Hysterectomy    OTHER SURGICAL HISTORY  01/12/2021    Hemorrhoidectomy    OTHER SURGICAL HISTORY  05/15/2020    Tonsillectomy    OTHER SURGICAL HISTORY  01/24/2020    Endoscopy        STOPBANG *** (+ ***).   *** personal/family hx of VTE.    FAMILY HISTORY:  Family History   Problem Relation Name Age of Onset    Anxiety disorder Mother      Depression Mother      Other (kidney stones) Mother      Anxiety disorder Father      Depression Father      Hypertension Father      Schizophrenia Other          SOCIAL HISTORY:  Social History     Tobacco Use    Smoking status: Never    Smokeless tobacco: Never   Substance  Use Topics    Alcohol use: Not Currently    Drug use: Never       MEDICATIONS:  Prior to Admission Medications:  Medication Documentation Review Audit       Reviewed by Ralph Chowdary MD (Physician) on 12/15/23 at 1601      Medication Order Taking? Sig Documenting Provider Last Dose Status   acetaminophen (Tylenol) 325 mg tablet [id number] No Take by mouth every 6 hours. Dg Silverman MD Taking Active   aspirin-acetaminophen-caffeine (Excedrin Extra Strength) 250-250-65 mg tablet [id number] No Take by mouth every 6 hours if needed. Dg Silverman MD Taking Active   brimonidine 0.33 % gel with pump [id number] No Apply a pea-sized amount to face once daily Dg Silverman MD Taking Active   cholecalciferol (Vitamin D-3) 50 mcg (2,000 unit) capsule [id number] No Take 1 capsule (50 mcg) by mouth once daily. Dg Silverman MD Taking Active   clonazePAM (KlonoPIN) 0.5 mg tablet [id number] No Take 1 tablet (0.5 mg) by mouth 3 times a day as needed. Dg Silverman MD Taking Active   dexAMETHasone (Decadron) 2 mg tablet [id number] No Take by mouth. Dg Silverman MD Taking Active   L. acidophilus/Bifid. animalis 32 billion cell capsule [id number] No Take by mouth. Dg Silverman MD Taking Active   lamoTRIgine (LaMICtal) 150 mg tablet [id number] No TAKE 2 TABLETS BY MOUTH AT BEDTIME. TOTAL DOSE 300MG Dg Silverman MD Taking Active   levothyroxine (Synthroid, Levoxyl) 50 mcg tablet [id number]  Take 1 tablet by mouth once daily Kayli Camejo, APRN-CNP  Active   lurasidone (Latuda) 40 mg tablet [id number] No TAKE 1 TABLET BY MOUTH IN THE EVENING WITH FOOD Dg Silverman MD Taking Active   melatonin 10 mg capsule [id number] No Take by mouth. Dg Silverman MD Taking Active   ondansetron (Zofran) 8 mg tablet [id number] No Take by mouth. Dg Silverman MD Taking Active   sertraline (Zoloft) 100 mg tablet [id number] No Take 1 tablet (100 mg) by mouth once daily in the morning. Take before  meals. Dg Silverman MD Taking Active   SUMAtriptan (Imitrex) 50 mg tablet [id number] No TAKE ONE TABLET BY MOUTH AT THE ONSET OF HEADACHE, CAN REPEAT IN 2 HOURS. MAX 4 TABLETS IN 1 DAYS Dg Silverman MD Taking Active   tiZANidine (Zanaflex) 4 mg tablet [id number]  TAKE 1 TABLET BY MOUTH AT BEDTIME Adalberto Padgett MD  Active   tobramycin-dexamethasone (Tobradex) ophthalmic suspension [id number] No INSTILL 1 DROP INTO LEFT EYE 4 TIMES DAILY FOR 7 DAYS Dg Silverman MD Taking Active   topiramate (Topamax) 100 mg tablet [id number] No Take 1 tablet (100 mg) by mouth 2 times a day. Dg Silverman MD Taking Active   Trulance tablet tablet [id number]  Take 1 tablet by mouth once daily Kayli Camejo, APRN-CNP  Active   valACYclovir (Valtrex) 500 mg tablet [id number] No TAKE 1 TABLET BY MOUTH ONCE DAILY DURING OUTBREAKS THEN TAKE 1 TABLET TWICE DAILY FOR 3 DAYS Historical MD Andra Taking Active   zolpidem (Ambien) 10 mg tablet [id number] No Take 1 tablet (10 mg) by mouth as needed at bedtime. Historical MD Andra Taking Active                     ALLERGIES:  Allergies   Allergen Reactions    Hydromorphone Itching       REVIEW OF SYSTEMS:  GENERAL: Negative for malaise, significant weight loss and fever  HEAD: Negative for headache, swelling.  NECK: Negative for lumps, goiter, pain and significant neck swelling  RESPIRATORY: Negative for cough, wheezing or shortness of breath.  CARDIOVASCULAR: Negative for chest pain, leg swelling or palpitations.  GI: Negative for abdominal discomfort, blood in stools or black stools or change in bowel habits  : No history of dysuria, frequency or incontinence  MUSCULOSKELETAL: Negative for joint pain or swelling, back pain or muscle pain.  SKIN: Negative for lesions, rash, and itching.  PSYCH: Negative for sleep disturbance, mood disorder and recent psychosocial stressors.  ENDOCRINE: Negative for cold or heat intolerance, polyuria, polydipsia and  goiter.    Objective   PHYSICAL EXAM:  Visit Vitals  Smoking Status Never     General appearance: obese, NAD  Neuro: AOx3  Head: EOMI; no swelling or lesions of scalp or face  ENT:  no lumps or lymphadenopathy, thyroid normal to palpation; oropharynx clear, no swelling or erythema  Skin: warm, no erythema or rashes  Lungs: clear to percussion and auscultation  Heart: regular rhythm and S1, S2 normal  Abdomen: soft, non-tender, no masses, no organomegaly  Extremities: Normal exam of the extremities. No swelling or pain.  Psych: no hurried speech, no flight of ideas, normal affect    Assessment/Plan   IMPRESSION:  Flower Guevara is a 43 y.o. female with a BMI of There is no height or weight on file to calculate BMI. with the following diagnoses and co-morbidities:     Past Medical History:   Diagnosis Date    Borderline personality disorder (CMS/[facility]) 07/13/2020    Borderline personality disorder    Depression, unspecified 11/15/2022    Depression, unspecified depression type    Hyperparathyroidism, unspecified (CMS/[facility]) 11/06/2019    Hyperparathyroidism    Insomnia, unspecified 11/06/2019    Insomnia, unspecified type    Other specified personal risk factors, not elsewhere classified 11/06/2019    History of drug overdose    Personal history of diseases of the blood and blood-forming organs and certain disorders involving the immune mechanism     History of anemia    Personal history of other diseases of the digestive system 11/06/2019    History of irritable bowel syndrome    Personal history of other diseases of the digestive system 11/06/2019    History of hemorrhoids    Personal history of other diseases of the digestive system 11/06/2019    History of fatty infiltration of liver    Personal history of other diseases of the nervous system and sense organs 11/06/2019    History of restless legs syndrome    Personal history of other diseases of the nervous system and sense organs 11/06/2019    History of migraine     Personal history of other endocrine, nutritional and metabolic disease 11/06/2019    History of thyroid disease    Personal history of other infectious and parasitic diseases 11/07/2019    History of Helicobacter pylori infection    Personal history of other mental and behavioral disorders 11/06/2019    History of anxiety    Personal history of other mental and behavioral disorders 11/06/2019    History of suicidal ideation    Personal history of other mental and behavioral disorders     History of mental disorder    Personal history of other specified conditions     History of tachycardia    Personal history of other specified conditions 11/06/2019    History of headache    Personal history of other specified conditions     History of snoring       This patient does meet the criteria for a surgical weight loss procedure according to NIH guidelines.  The risks of sleeve gastrectomy, Reynaldo-en-Y gastric bypass, and duodenal switch surgery including but not limited to bleeding, leak along staple lines, infection, dehydration, ulcers, internal hernia, DVT/PE, pneumonia, myocardial infarction, prolonged nausea/vomiting, incomplete resolution of associated medical conditions, reflux, weight regain, vitamin/mineral deficiencies, and death have been explained to the patient and Flower Guevara has expressed understanding and acceptance of them.     We discussed the lifestyle changes necessary to be successful following surgery.    The increased risk of substance and alcohol abuse following bariatric surgery was discussed with the patient, along with the negative consequences of substance/alcohol use after surgery including addiction, worsening of mental health disorders, and injury to the stomach. The risk of smoking and vaping (tobacco or any other substance) after bariatric surgery was explained to the patient. This includes risk of anastamotic ulcers, gastritis, bleeding, perforation, stricture, and PO intolerance.   The patient expressed understanding and acceptance of these risks.    ***The patient was advised not to become pregnant within 12-18 months following bariatric surgery. She was educated on the increased risks to mother and fetus associated with pregnancy within 2 years of bariatric surgery.    The benefits of the above surgeries including weight loss, improvement/resolution of associated medical and mental health conditions, improved mobility, and decreased mortality have been explained the the patient and Flower Guevara has expressed understanding and acceptance of them.      PLAN:  The plan of treatment for Flower Guevara is to continue with the consultations and tests ordered today in hopes of qualifying for pre-operative clearance for bariatric surgery. This includes:    Consult Nutrition for education and 6 months of MSWL  Consult Psychology  Consult Cardiology  Consult Pulmonology  Labs ordered  EGD  PCP for medical optimization  Consult sleep medicine - concern for VANESSA  Recommend at least *** lbs of weight loss prior to surgery.  ***    The following are some lifestyle changes you should begin to prepare you for your surgery.   Eliminate soda and other carbonated beverages from your diet. Carbonation will not be well tolerated after surgery. Try Propel, Vitamin Water Zero, Sobe Lifewater, Crystal Light or water.    Increase fluid consumption to 64 oz daily. Do not drink within 30 minutes of eating as this will liquefy your food and make you hungry more quickly.    Exercise for 30-60 minutes daily. Brisk walking, bike riding and swimming are all examples of healthy exercise. If you are unable to exercise we recommend seated exercise.    Do not skip meals.    Take a multivitamin daily.    Lose weight. In preparation for your surgery it is important that you begin making healthier food choices now. Our dietitian will meet with you to help you select foods lower in calories and higher in nutrition. We would  like you to lose at least *** lbs prior to surgery.     Increase your protein intake to 60 grams per day.    Alcohol is empty calories. Please eliminate while preparing for surgery.    Plan your meals.      General Instruction:   1) Use the information we gave you today to work through your insurance requirements and medical clearances.   2) These documents need to get faxed or emailed to the program navigators so they can submit them for approval from your insurance company.   3) Obtain labs today at a  facility. We will call you with any abnormalities and corrections you need to make.   4) Continue to work with your primary care doctor and other specialist so your other health problems are well controlled prior to your surgery.   5) Adopt the recommendations of the program dietician so you develop healthy eating patterns.   6) Work with the sleep team to get your sleep apnea treated to prevent other health problems .   7) Consider attending a support group to learn from other who have been through the process.   8) Come to the MSWL sessions.       *** minutes were spent with patient including history, physical exam, and education.

## 2024-01-02 ENCOUNTER — DOCUMENTATION (OUTPATIENT)
Dept: SURGERY | Facility: HOSPITAL | Age: 44
End: 2024-01-02
Payer: COMMERCIAL

## 2024-01-02 NOTE — PROGRESS NOTES
I received a message from N[facility] that the pt no longer wants to proceed with the surgical process. Pt was dropped from the program and work list was updated.

## 2024-01-03 ENCOUNTER — APPOINTMENT (OUTPATIENT)
Dept: SURGERY | Facility: CLINIC | Age: 44
End: 2024-01-03
Payer: COMMERCIAL

## 2024-01-05 ENCOUNTER — APPOINTMENT (OUTPATIENT)
Dept: DERMATOLOGY | Facility: CLINIC | Age: 44
End: 2024-01-05
Payer: COMMERCIAL

## 2024-01-11 ENCOUNTER — HOSPITAL ENCOUNTER (OUTPATIENT)
Facility: HOSPITAL | Age: 44
Setting detail: OUTPATIENT SURGERY
Discharge: HOME | End: 2024-01-11
Attending: FAMILY MEDICINE | Admitting: FAMILY MEDICINE
Payer: COMMERCIAL

## 2024-01-11 ENCOUNTER — APPOINTMENT (OUTPATIENT)
Dept: SURGERY | Facility: CLINIC | Age: 44
End: 2024-01-11
Payer: COMMERCIAL

## 2024-01-11 VITALS
SYSTOLIC BLOOD PRESSURE: 122 MMHG | WEIGHT: 229.28 LBS | OXYGEN SATURATION: 99 % | TEMPERATURE: 97.9 F | HEIGHT: 68 IN | DIASTOLIC BLOOD PRESSURE: 68 MMHG | BODY MASS INDEX: 34.75 KG/M2 | HEART RATE: 88 BPM | RESPIRATION RATE: 18 BRPM

## 2024-01-11 DIAGNOSIS — M77.11 LATERAL EPICONDYLITIS, RIGHT ELBOW: Primary | ICD-10-CM

## 2024-01-11 PROCEDURE — 2720000007 HC OR 272 NO HCPCS: Performed by: FAMILY MEDICINE

## 2024-01-11 PROCEDURE — 3600000008 HC OR TIME - EACH INCREMENTAL 1 MINUTE - PROCEDURE LEVEL THREE: Performed by: FAMILY MEDICINE

## 2024-01-11 PROCEDURE — 76942 ECHO GUIDE FOR BIOPSY: CPT | Performed by: FAMILY MEDICINE

## 2024-01-11 PROCEDURE — 2500000005 HC RX 250 GENERAL PHARMACY W/O HCPCS: Performed by: FAMILY MEDICINE

## 2024-01-11 PROCEDURE — 24357 REPAIR ELBOW PERC: CPT | Performed by: FAMILY MEDICINE

## 2024-01-11 PROCEDURE — 94760 N-INVAS EAR/PLS OXIMETRY 1: CPT

## 2024-01-11 PROCEDURE — 3600000003 HC OR TIME - INITIAL BASE CHARGE - PROCEDURE LEVEL THREE: Performed by: FAMILY MEDICINE

## 2024-01-11 PROCEDURE — 7100000010 HC PHASE TWO TIME - EACH INCREMENTAL 1 MINUTE: Performed by: FAMILY MEDICINE

## 2024-01-11 PROCEDURE — 7100000009 HC PHASE TWO TIME - INITIAL BASE CHARGE: Performed by: FAMILY MEDICINE

## 2024-01-11 RX ORDER — ONDANSETRON HYDROCHLORIDE 8 MG/1
8 TABLET, FILM COATED ORAL ONCE
Status: COMPLETED | OUTPATIENT
Start: 2024-01-11 | End: 2024-01-11

## 2024-01-11 RX ORDER — BUPIVACAINE HCL/EPINEPHRINE 0.5-1:200K
VIAL (ML) INJECTION AS NEEDED
Status: DISCONTINUED | OUTPATIENT
Start: 2024-01-11 | End: 2024-01-11 | Stop reason: HOSPADM

## 2024-01-11 RX ADMIN — ONDANSETRON HYDROCHLORIDE 8 MG: 8 TABLET, FILM COATED ORAL at 12:52

## 2024-01-11 ASSESSMENT — PAIN SCALES - GENERAL
PAINLEVEL_OUTOF10: 0 - NO PAIN
PAINLEVEL_OUTOF10: 2
PAINLEVEL_OUTOF10: 0 - NO PAIN

## 2024-01-11 ASSESSMENT — PAIN - FUNCTIONAL ASSESSMENT
PAIN_FUNCTIONAL_ASSESSMENT: 0-10

## 2024-01-11 NOTE — OP NOTE
0 RIGHT ELBOW TENOTOMY (R) Operative Note     Date: 2024  OR Location: [facility]    Name: Flower Guevara, : 1980, Age: 43 y.o., MRN: [id number], Sex: female    Diagnosis  Pre-op Diagnosis     * Lateral epicondylitis, right elbow [M77.11] Post-op Diagnosis     * Lateral epicondylitis, right elbow [M77.11]     Procedures  RIGHT ELBOW TENOTOMY  15388 - SD TENOTOMY ELBOW LATERAL/MEDIAL PERCUTANEOUS      Surgeons      * Ralph Chowdary - Primary    Resident/Fellow/Other Assistant:  Surgeon(s) and Role:    Procedure Summary  Anesthesia: Local  ASA: ASA status not filed in the log.  Anesthesia Staff: No anesthesia staff entered.  Estimated Blood Loss: 0 mL  Intra-op Medications: * No intraprocedure medications in log *      Intraprocedure I/O Totals       None           Specimen: No specimens collected     Staff:   Circulator: Debora Adam RN; Marley Mustafa RN  Scrub Person: Kyle Parra         Drains and/or Catheters: * None in log *    Tourniquet Times:         Implants:     Findings: Tendinosis right elbow common extensor tendon    Indications: Flower Guevara is an 43 y.o. female who is having surgery for Lateral epicondylitis, right elbow [M77.11].     The patient was seen in the preoperative area. The risks, benefits, complications, treatment options, non-operative alternatives, expected recovery and outcomes were discussed with the patient. The possibilities of reaction to medication, pulmonary aspiration, injury to surrounding structures, bleeding, recurrent infection, the need for additional procedures, failure to diagnose a condition, and creating a complication requiring transfusion or operation were discussed with the patient. The patient concurred with the proposed plan, giving informed consent.  The site of surgery was properly noted/marked if necessary per policy. The patient has been actively warmed in preoperative area. Preoperative antibiotics are not indicated. Venous thrombosis  prophylaxis are not indicated.    Procedure Details: Preoperative Diagnosis:  lateral epicondylitis right elbow  Postoperative Diagnosis:  Same.  Procedure Performed:   Ultrasound-guided percutaneous tenotomy of the ECRB/common extensor tendon at the  lateral epicondyle using Tenjet system  Gross Findings and Description of Procedure:  CONSENT: Signed consent was obtained by patient.  ANESTHESIA: Local with a 6 mL mix of plain 1% lidocaine and 0.25% Marcaine.   DESCRIPTION OF PROCEDURE: A sterile sleeve was placed over the ultrasound  transducer. The anatomy was identified and the diseased tendon was seen at the common  extensor tendon/ECRB near the insertion at the lateral epicondyle.  The area was prepped with an antimicrobial solution and draped in sterile fashion. The area  was then injected with approximately 6 mL of a local anesthetic with a 50/50 mix of 1%  lidocaine and 0.25% Marcaine using a 25-gauge needle. A skin wheal was placed and a  tract was made down to the tendon. A #11 blade was then introduced to create a tract down  to the tendon under direct ultrasound visualization. The Tenjet instrument was then  introduced through the puncture site advancing to the diseased tendon. Once the tip of the  instrument was located in the hypoechoic lesion within the tendon, the foot pedal was  depressed and the area of diseased tendon was removed   Following the procedure, steri-strips were placed followed by a Tegaderm dressing. Post  procedure instructions were given to the patient who verbalized understanding. The patient  tolerated the procedure well and was discharged in good condition.    Complications:  None; patient tolerated the procedure well.    Disposition: PACU - hemodynamically stable.  Condition: stable         Additional Details: None    Attending Attestation: I performed the procedure.    Ralph Chowdary  Phone Number: [phone]

## 2024-01-11 NOTE — DISCHARGE INSTRUCTIONS
Post Procedure Instructions for Tenex or TenJet  Keep bandages and procedure area clean and dry for 7 days.   Avoid submerging area in water (i.e. Swimming/Bathing) for 7 days.   May apply ice for 20 minutes as needed for discomfort.   May take over the counter pain medication or prescription, if provided.   Please make sure you follow up within one week or directed by Dr. Chowdary    Call [phone] if you have questions/concerns.  E-mail: [email or web address]    Please contact office if:   Area becomes red or hot to touch  Fever of 100° or more.    Increased pain or swelling   Drainage from treatment site.   SPECIFIC PATIENT INSTRUCTIONS       ? Elbow   Rest arm and hand today.   May resume non-repetitive sedentary use of arm/hand in 3 days.   Light daily activity for 2 weeks, then progress as tolerated.   May begin stretching and eccentric exercise at 2 weeks.   NO lifting objections with arm/hand greater than 5 pounds for 6 weeks.   May gradually resume normal use of arm/hand at 6 weeks as tolerated and subject to physician approval.

## 2024-01-16 ENCOUNTER — APPOINTMENT (OUTPATIENT)
Dept: PRIMARY CARE | Facility: CLINIC | Age: 44
End: 2024-01-16
Payer: COMMERCIAL

## 2024-01-17 ENCOUNTER — APPOINTMENT (OUTPATIENT)
Dept: SURGERY | Facility: CLINIC | Age: 44
End: 2024-01-17
Payer: COMMERCIAL

## 2024-01-18 ENCOUNTER — OFFICE VISIT (OUTPATIENT)
Dept: ORTHOPEDIC SURGERY | Facility: CLINIC | Age: 44
End: 2024-01-18
Payer: COMMERCIAL

## 2024-01-18 VITALS — WEIGHT: 230 LBS | HEIGHT: 67 IN | BODY MASS INDEX: 36.1 KG/M2

## 2024-01-18 DIAGNOSIS — M77.11 LATERAL EPICONDYLITIS OF RIGHT ELBOW: Primary | ICD-10-CM

## 2024-01-18 PROCEDURE — 1036F TOBACCO NON-USER: CPT | Performed by: FAMILY MEDICINE

## 2024-01-18 PROCEDURE — 99024 POSTOP FOLLOW-UP VISIT: CPT | Performed by: FAMILY MEDICINE

## 2024-01-18 NOTE — PROGRESS NOTES
History of Present Illness   Chief Complaint   Patient presents with    Right Elbow - Post-op     Lateral epicondylitis, right elbow  DOS:1/11/24       The patient is 43 y.o. left hand dominant female here for 1 week follow up status post Tenjet procedure for treatment of right elbow lateral epicondylitis.  Patient is doing well, still admits to some discomfort at the lateral elbow, she denies any concerns about incision site which she says is healed.    Past Medical History:   Diagnosis Date    Borderline personality disorder (CMS/[facility]) 07/13/2020    Borderline personality disorder    Depression, unspecified 11/15/2022    Depression, unspecified depression type    Hyperparathyroidism, unspecified (CMS/[facility]) 11/06/2019    Hyperparathyroidism    Insomnia, unspecified 11/06/2019    Insomnia, unspecified type    Other specified personal risk factors, not elsewhere classified 11/06/2019    History of drug overdose    Personal history of diseases of the blood and blood-forming organs and certain disorders involving the immune mechanism     History of anemia    Personal history of other diseases of the digestive system 11/06/2019    History of irritable bowel syndrome    Personal history of other diseases of the digestive system 11/06/2019    History of hemorrhoids    Personal history of other diseases of the digestive system 11/06/2019    History of fatty infiltration of liver    Personal history of other diseases of the nervous system and sense organs 11/06/2019    History of restless legs syndrome    Personal history of other diseases of the nervous system and sense organs 11/06/2019    History of migraine    Personal history of other endocrine, nutritional and metabolic disease 11/06/2019    History of thyroid disease    Personal history of other infectious and parasitic diseases 11/07/2019    History of Helicobacter pylori infection    Personal history of other mental and behavioral disorders 11/06/2019    History of  anxiety    Personal history of other mental and behavioral disorders 11/06/2019    History of suicidal ideation    Personal history of other mental and behavioral disorders     History of mental disorder    Personal history of other specified conditions     History of tachycardia    Personal history of other specified conditions 11/06/2019    History of headache    Personal history of other specified conditions     History of snoring       Medication Documentation Review Audit       Reviewed by Alice Lawson LPN (Licensed Nurse) on 01/18/24 at 1413      Medication Order Taking? Sig Documenting Provider Last Dose Status   acetaminophen (Tylenol) 325 mg tablet [id number] No Take by mouth every 6 hours. Dg Silverman MD Past Week Active   aspirin-acetaminophen-caffeine (Excedrin Extra Strength) 250-250-65 mg tablet [id number] No Take by mouth every 6 hours if needed. Historical Provider, MD Unknown Active   brimonidine 0.33 % gel with pump [id number] No Apply a pea-sized amount to face once daily Historical Provider, MD Unknown Active   cholecalciferol (Vitamin D-3) 50 mcg (2,000 unit) capsule [id number] No Take 1 capsule (50 mcg) by mouth once daily. Dg Silverman MD More than a month Active   clonazePAM (KlonoPIN) 0.5 mg tablet [id number] No Take 1 tablet (0.5 mg) by mouth 3 times a day as needed. Dg Silverman MD 1/10/2024 Active   L. acidophilus/Bifid. animalis 32 billion cell capsule [id number] No Take by mouth. Dg Provider, MD More than a month Active   lamoTRIgine (LaMICtal) 150 mg tablet [id number] No TAKE 2 TABLETS BY MOUTH AT BEDTIME. TOTAL DOSE 300MG Dg Silverman MD 1/10/2024 Active   levothyroxine (Synthroid, Levoxyl) 50 mcg tablet [id number] No Take 1 tablet by mouth once daily Kayli Camejo, APRN-CNP 1/11/2024 Active   lurasidone (Latuda) 40 mg tablet [id number] No TAKE 1 TABLET BY MOUTH IN THE EVENING WITH FOOD Dg Silverman MD 1/10/2024 Active   melatonin 10 mg  capsule [id number] No Take by mouth. Historical MD Andra Past Week Active   ondansetron (Zofran) 8 mg tablet [id number] No Take by mouth. Historical Provider, MD Past Month Active   sertraline (Zoloft) 100 mg tablet [id number] No Take 1 tablet (100 mg) by mouth once daily in the morning. Take before meals. Dg Silverman MD 1/11/2024 Active   SUMAtriptan (Imitrex) 50 mg tablet [id number] No TAKE ONE TABLET BY MOUTH AT THE ONSET OF HEADACHE, CAN REPEAT IN 2 HOURS. MAX 4 TABLETS IN 1 DAYS Dg Silverman MD Past Week Active   tiZANidine (Zanaflex) 4 mg tablet [id number] No TAKE 1 TABLET BY MOUTH AT BEDTIME Adalberto Padgett MD Past Week Active   topiramate (Topamax) 100 mg tablet [id number] No Take 1 tablet (100 mg) by mouth 2 times a day. Dg Silverman MD 1/10/2024 Active   Trulance tablet tablet [id number] No Take 1 tablet by mouth once daily Kayli Camejo, APRN-CNP Past Week Active   valACYclovir (Valtrex) 500 mg tablet [id number] No TAKE 1 TABLET BY MOUTH ONCE DAILY DURING OUTBREAKS THEN TAKE 1 TABLET TWICE DAILY FOR 3 DAYS Dg Silverman MD 1/11/2024 Active   zolpidem (Ambien) 10 mg tablet [id number] No Take 1 tablet (10 mg) by mouth as needed at bedtime. Historical MD Andra 1/10/2024 Active                    Allergies   Allergen Reactions    Hydromorphone Itching       Social History     Socioeconomic History    Marital status:      Spouse name: Not on file    Number of children: Not on file    Years of education: Not on file    Highest education level: Not on file   Occupational History    Not on file   Tobacco Use    Smoking status: Never    Smokeless tobacco: Never   Substance and Sexual Activity    Alcohol use: Not Currently    Drug use: Never    Sexual activity: Not on file   Other Topics Concern    Not on file   Social History Narrative    Not on file     Social Determinants of Health     Financial Resource Strain: Not on file   Food Insecurity: Not on file   Transportation  Needs: Not on file   Physical Activity: Not on file   Stress: Not on file   Social Connections: Not on file   Intimate Partner Violence: Not on file   Housing Stability: Not on file       Past Surgical History:   Procedure Laterality Date    OTHER SURGICAL HISTORY  06/18/2019    Hysterectomy    OTHER SURGICAL HISTORY  01/12/2021    Hemorrhoidectomy    OTHER SURGICAL HISTORY  05/15/2020    Tonsillectomy    OTHER SURGICAL HISTORY  01/24/2020    Endoscopy             Physical Exam:    Right elbow: Healed incision at lateral elbow, there is no redness warmth or drainage, there is some mild ecchymosis.  She is appropriately tender to palpation at incision site and lateral epicondyle, she has full range of motion at the elbow, strength testing at the wrist was deferred today.  Upper extremity is neurovascular intact    Assessment/Plan     1. Lateral epicondylitis of right elbow              Patient is 1 week status post Tenjet for treatment of right elbow lateral epicondylitis, doing well.  We discussed that she continue with her weight restrictions, 5 pounds in the right upper extremity for the next 6 weeks, she can begin rehabilitation program in 1 week, starting with some gentle stretching, progressing to strengthening as tolerated, she did do therapy prior to procedure, would prefer to do therapy on her own rather than returning to formal therapy which I think is reasonable.  She will follow-up in 5 weeks for reassessment

## 2024-01-22 ENCOUNTER — OFFICE VISIT (OUTPATIENT)
Dept: NEUROLOGY | Facility: CLINIC | Age: 44
End: 2024-01-22
Payer: COMMERCIAL

## 2024-01-22 VITALS
TEMPERATURE: 97.3 F | BODY MASS INDEX: 37.04 KG/M2 | SYSTOLIC BLOOD PRESSURE: 139 MMHG | HEART RATE: 95 BPM | WEIGHT: 236 LBS | HEIGHT: 67 IN | DIASTOLIC BLOOD PRESSURE: 81 MMHG

## 2024-01-22 DIAGNOSIS — G43.711 INTRACTABLE CHRONIC MIGRAINE WITHOUT AURA AND WITH STATUS MIGRAINOSUS: Primary | ICD-10-CM

## 2024-01-22 PROCEDURE — 1036F TOBACCO NON-USER: CPT | Performed by: PSYCHIATRY & NEUROLOGY

## 2024-01-22 PROCEDURE — 99213 OFFICE O/P EST LOW 20 MIN: CPT | Performed by: PSYCHIATRY & NEUROLOGY

## 2024-01-22 RX ORDER — ERENUMAB-AOOE 140 MG/ML
140 INJECTION, SOLUTION SUBCUTANEOUS
Qty: 1 ML | Refills: 3 | Status: SHIPPED | OUTPATIENT
Start: 2024-01-22 | End: 2024-05-21

## 2024-01-22 RX ORDER — BUTALBITAL, ACETAMINOPHEN AND CAFFEINE 50; 325; 40 MG/1; MG/1; MG/1
1 TABLET ORAL EVERY 6 HOURS PRN
Qty: 15 TABLET | Refills: 3 | Status: SHIPPED | OUTPATIENT
Start: 2024-01-22 | End: 2024-01-26 | Stop reason: SDUPTHER

## 2024-01-22 RX ORDER — TOPIRAMATE 100 MG/1
200 TABLET, FILM COATED ORAL NIGHTLY
Qty: 180 TABLET | Refills: 3 | Status: SHIPPED | OUTPATIENT
Start: 2024-01-22 | End: 2024-04-02 | Stop reason: SDUPTHER

## 2024-01-22 NOTE — LETTER
January 22, 2024     WINIFRED Stock-CNP  [address] [place] [address] Rd  [address]  [facility] [address]    Patient: Flower Guevara   YOB: 1980   Date of Visit: 1/22/2024       Dear WINIFRED Mccann-CNP:    Thank you for allowing me to continue in the neurological care of your patient, Flower Guevara. Below are my notes for this visit.  If you have questions, please do not hesitate to call me. I look forward to following your patient along with you.       Sincerely,     Adalberto Padgett Jr., M.D., KENZIEN       CC: No Recipients  ______________________________________________________________________________________    NEUROLOGY OUTPATIENT FOLLOW-UP NOTE    Assessment/Plan  Diagnoses and all orders for this visit:  Intractable chronic migraine without aura and with status migrainosus  -     topiramate (Topamax) 100 mg tablet; Take 2 tablets (200 mg) by mouth once daily at bedtime.  -     butalbital-acetaminophen-caff -40 mg tablet; Take 1 tablet by mouth every 6 hours if needed for headaches. Max of 3 in one day      IMPRESSION:  Intractable migraine without aura, topiramate failure.    PLAN:  Reduced topiramate back to 200 mg at bedtime.  Will add Aimovig for headache prophylaxis given failure of multiple oral agents.  Butalbital/APAP/caffeine at onset of headache, use as rescue or if sumatriptan not helping.  I will see her again in 3-4 months or prn.      Adalberto Padgett Jr., M.D., KENZIEN   ----------    Subjective    Flower Guevara is a 43 y.o. year old female here for follow-up.    Headache frequency increased, now 4-5 a week for the last two months, and despite increasing topiramate to 300 mg at bedtime.  Took samples of Ubrelvy from her PCNP and it was not helpful for the headaches.  Sumatriptan helps individual headaches but she quickly goes through all nine she is allowed for the month.    HEADACHE HISTORY:     Headache type: Unilateral or bilateral (usually holocranial) pounding  with photophobia, phonophobia, nausea, and emesis.      Number of headache days per week/month: 4-5 days a week     Number of months with headache frequency: 6 months     Abortive medications tried for migraine: Excedrin Migraine, OTC acetaminophen, OTC ibuprofen, OTC naproxen, ASA, with only the Excedrin Migraine helpful, sumatriptan (helpful but runs out early), Ubrelvy (unhelpful)     Prophylactic medications tried for migraine: amitriptyline (unhelpful), venlafaxine (unhelpful), propranolol (unhelpful), topiramate (currently on)    Patient Active Problem List   Diagnosis   • Generalized anxiety disorder   • Bipolar disorder with depression (CMS/[facility])   • Borderline personality disorder (CMS/[facility])   • Chronic idiopathic constipation   • Endometriosis   • External hemorrhoids   • Fatty liver   • Hair loss   • HSV-2 (herpes simplex virus 2) infection   • Hyperparathyroidism (CMS/[facility])   • Hypoactive sexual desire disorder   • Hypothyroidism   • Insomnia   • Intractable chronic migraine without aura and with status migrainosus   • Irritable bowel syndrome   • Low libido   • Mixed hyperlipidemia   • Nausea in adult   • Cervicalgia   • Pelvic floor dysfunction   • Rosacea   • Recurrent vaginitis   • Vitamin D deficiency   • Depressive disorder   • Fatigue   • Hormone imbalance   • MDD (major depressive disorder), recurrent severe, without psychosis (CMS/[facility])   • Migraines   • Segmental and somatic dysfunction   • Tinnitus of both ears   • Transaminitis   • PTSD (post-traumatic stress disorder)   • Lateral epicondylitis of right elbow   • Lateral epicondylitis, right elbow   • Bariatric surgery status     Past Medical History:   Diagnosis Date   • Borderline personality disorder (CMS/[facility]) 07/13/2020    Borderline personality disorder   • Depression, unspecified 11/15/2022    Depression, unspecified depression type   • Hyperparathyroidism, unspecified (CMS/[facility]) 11/06/2019    Hyperparathyroidism   • Insomnia, unspecified  11/06/2019    Insomnia, unspecified type   • Other specified personal risk factors, not elsewhere classified 11/06/2019    History of drug overdose   • Personal history of diseases of the blood and blood-forming organs and certain disorders involving the immune mechanism     History of anemia   • Personal history of other diseases of the digestive system 11/06/2019    History of irritable bowel syndrome   • Personal history of other diseases of the digestive system 11/06/2019    History of hemorrhoids   • Personal history of other diseases of the digestive system 11/06/2019    History of fatty infiltration of liver   • Personal history of other diseases of the nervous system and sense organs 11/06/2019    History of restless legs syndrome   • Personal history of other diseases of the nervous system and sense organs 11/06/2019    History of migraine   • Personal history of other endocrine, nutritional and metabolic disease 11/06/2019    History of thyroid disease   • Personal history of other infectious and parasitic diseases 11/07/2019    History of Helicobacter pylori infection   • Personal history of other mental and behavioral disorders 11/06/2019    History of anxiety   • Personal history of other mental and behavioral disorders 11/06/2019    History of suicidal ideation   • Personal history of other mental and behavioral disorders     History of mental disorder   • Personal history of other specified conditions     History of tachycardia   • Personal history of other specified conditions 11/06/2019    History of headache   • Personal history of other specified conditions     History of snoring     Past Surgical History:   Procedure Laterality Date   • OTHER SURGICAL HISTORY  06/18/2019    Hysterectomy   • OTHER SURGICAL HISTORY  01/12/2021    Hemorrhoidectomy   • OTHER SURGICAL HISTORY  05/15/2020    Tonsillectomy   • OTHER SURGICAL HISTORY  01/24/2020    Endoscopy     Social History     Tobacco Use   • Smoking  status: Never   • Smokeless tobacco: Never   Substance Use Topics   • Alcohol use: Not Currently     family history includes Anxiety disorder in her father and mother; Depression in her father and mother; Hypertension in her father; Schizophrenia in an other family member; kidney stones in her mother.    Current Outpatient Medications:   •  acetaminophen (Tylenol) 325 mg tablet, Take by mouth every 6 hours., Disp: , Rfl:   •  aspirin-acetaminophen-caffeine (Excedrin Extra Strength) 250-250-65 mg tablet, Take by mouth every 6 hours if needed., Disp: , Rfl:   •  brimonidine 0.33 % gel with pump, Apply a pea-sized amount to face once daily, Disp: , Rfl:   •  cholecalciferol (Vitamin D-3) 50 mcg (2,000 unit) capsule, Take 1 capsule (50 mcg) by mouth once daily., Disp: , Rfl:   •  clonazePAM (KlonoPIN) 0.5 mg tablet, Take 1 tablet (0.5 mg) by mouth 3 times a day as needed., Disp: , Rfl:   •  L. acidophilus/Bifid. animalis 32 billion cell capsule, Take by mouth., Disp: , Rfl:   •  lamoTRIgine (LaMICtal) 150 mg tablet, TAKE 2 TABLETS BY MOUTH AT BEDTIME. TOTAL DOSE 300MG, Disp: , Rfl:   •  levothyroxine (Synthroid, Levoxyl) 50 mcg tablet, Take 1 tablet by mouth once daily, Disp: 90 tablet, Rfl: 0  •  lurasidone (Latuda) 40 mg tablet, TAKE 1 TABLET BY MOUTH IN THE EVENING WITH FOOD, Disp: , Rfl:   •  melatonin 10 mg capsule, Take by mouth., Disp: , Rfl:   •  ondansetron (Zofran) 8 mg tablet, Take by mouth., Disp: , Rfl:   •  sertraline (Zoloft) 100 mg tablet, Take 1 tablet (100 mg) by mouth once daily in the morning. Take before meals., Disp: , Rfl:   •  SUMAtriptan (Imitrex) 50 mg tablet, TAKE ONE TABLET BY MOUTH AT THE ONSET OF HEADACHE, CAN REPEAT IN 2 HOURS. MAX 4 TABLETS IN 1 DAYS, Disp: , Rfl:   •  tiZANidine (Zanaflex) 4 mg tablet, TAKE 1 TABLET BY MOUTH AT BEDTIME, Disp: 30 tablet, Rfl: 1  •  topiramate (Topamax) 100 mg tablet, Take 1 tablet (100 mg) by mouth 2 times a day., Disp: , Rfl:   •  Trulance tablet tablet,  "Take 1 tablet by mouth once daily, Disp: 90 tablet, Rfl: 0  •  valACYclovir (Valtrex) 500 mg tablet, TAKE 1 TABLET BY MOUTH ONCE DAILY DURING OUTBREAKS THEN TAKE 1 TABLET TWICE DAILY FOR 3 DAYS, Disp: , Rfl:   •  zolpidem (Ambien) 10 mg tablet, Take 1 tablet (10 mg) by mouth as needed at bedtime., Disp: , Rfl:   Allergies   Allergen Reactions   • Hydromorphone Itching       Objective    /81   Pulse 95   Temp 36.3 °C (97.3 °F)   Ht 1.702 m (5' 7\")   Wt 107 kg (236 lb)   BMI 36.96 kg/m²     CONSTITUTIONAL:  No acute distress    MENTAL STATUS:  Awake, alert, fully oriented to self, place, and time, with present short-term memory, good awareness of recent events, normal attention span, concentration, and fund of knowledge.    SPEECH AND LANGUAGE:  Can name and repeat, follows all commands, has no dysarthria    CRANIAL NERVES:  II-Vision present, visual fields full to confrontational testing    III/IV/VI--EOMs are present in all directions.  Pupils are symmetrically reactive in dim light.  No ptosis.    V--Normal facial sensation.    VII--No facial asymmetry.    VIII--Hearing present to voice bilaterally.    IX/X--Symmetric soft palate rise.    XI--Normal trapezius power bilaterally.    XII--Tongue protrudes without deviation.    MOTOR:  Normal power, tone, and bulk in both arms and both legs.    SENSORY:  Normal pin sensation in both arms and both legs without distal-proximal gradient, asymmetry, or spinal sensory level.    COORDINATION:  Normal finger-to-nose and heel-to-shin testing in both arms and both legs.    REFLEXES are normal and symmetric at the biceps, triceps, brachioradialis, patella, and ankle.  The plantar responses are flexor.    GAIT is normal, without steppage, ataxia, shuffling, or spasticity.      Adalberto Padgett Jr., M.D., FAAN     "

## 2024-01-22 NOTE — PROGRESS NOTES
NEUROLOGY OUTPATIENT FOLLOW-UP NOTE    Assessment/Plan   Diagnoses and all orders for this visit:  Intractable chronic migraine without aura and with status migrainosus  -     topiramate (Topamax) 100 mg tablet; Take 2 tablets (200 mg) by mouth once daily at bedtime.  -     butalbital-acetaminophen-caff -40 mg tablet; Take 1 tablet by mouth every 6 hours if needed for headaches. Max of 3 in one day      IMPRESSION:  Intractable migraine without aura, topiramate failure.    PLAN:  Reduced topiramate back to 200 mg at bedtime.  Will add Aimovig for headache prophylaxis given failure of multiple oral agents.  Butalbital/APAP/caffeine at onset of headache, use as rescue or if sumatriptan not helping.  I will see her again in 3-4 months or prn.      Adalberto Padgett Jr., M.D., FAAN   ----------    Subjective     Flower Guevara is a 43 y.o. year old female here for follow-up.    Headache frequency increased, now 4-5 a week for the last two months, and despite increasing topiramate to 300 mg at bedtime.  Took samples of Ubrelvy from her PCNP and it was not helpful for the headaches.  Sumatriptan helps individual headaches but she quickly goes through all nine she is allowed for the month.    HEADACHE HISTORY:     Headache type: Unilateral or bilateral (usually holocranial) pounding with photophobia, phonophobia, nausea, and emesis.      Number of headache days per week/month: 4-5 days a week     Number of months with headache frequency: 6 months     Abortive medications tried for migraine: Excedrin Migraine, OTC acetaminophen, OTC ibuprofen, OTC naproxen, ASA, with only the Excedrin Migraine helpful, sumatriptan (helpful but runs out early), Ubrelvy (unhelpful)     Prophylactic medications tried for migraine: amitriptyline (unhelpful), venlafaxine (unhelpful), propranolol (unhelpful), topiramate (currently on)    Patient Active Problem List   Diagnosis    Generalized anxiety disorder    Bipolar disorder with  depression (CMS/HCC)    Borderline personality disorder (CMS/HCC)    Chronic idiopathic constipation    Endometriosis    External hemorrhoids    Fatty liver    Hair loss    HSV-2 (herpes simplex virus 2) infection    Hyperparathyroidism (CMS/HCC)    Hypoactive sexual desire disorder    Hypothyroidism    Insomnia    Intractable chronic migraine without aura and with status migrainosus    Irritable bowel syndrome    Low libido    Mixed hyperlipidemia    Nausea in adult    Cervicalgia    Pelvic floor dysfunction    Rosacea    Recurrent vaginitis    Vitamin D deficiency    Depressive disorder    Fatigue    Hormone imbalance    MDD (major depressive disorder), recurrent severe, without psychosis (CMS/[facility])    Migraines    Segmental and somatic dysfunction    Tinnitus of both ears    Transaminitis    PTSD (post-traumatic stress disorder)    Lateral epicondylitis of right elbow    Lateral epicondylitis, right elbow    Bariatric surgery status     Past Medical History:   Diagnosis Date    Borderline personality disorder (CMS/[facility]) 07/13/2020    Borderline personality disorder    Depression, unspecified 11/15/2022    Depression, unspecified depression type    Hyperparathyroidism, unspecified (CMS/[facility]) 11/06/2019    Hyperparathyroidism    Insomnia, unspecified 11/06/2019    Insomnia, unspecified type    Other specified personal risk factors, not elsewhere classified 11/06/2019    History of drug overdose    Personal history of diseases of the blood and blood-forming organs and certain disorders involving the immune mechanism     History of anemia    Personal history of other diseases of the digestive system 11/06/2019    History of irritable bowel syndrome    Personal history of other diseases of the digestive system 11/06/2019    History of hemorrhoids    Personal history of other diseases of the digestive system 11/06/2019    History of fatty infiltration of liver    Personal history of other diseases of the nervous system  and sense organs 11/06/2019    History of restless legs syndrome    Personal history of other diseases of the nervous system and sense organs 11/06/2019    History of migraine    Personal history of other endocrine, nutritional and metabolic disease 11/06/2019    History of thyroid disease    Personal history of other infectious and parasitic diseases 11/07/2019    History of Helicobacter pylori infection    Personal history of other mental and behavioral disorders 11/06/2019    History of anxiety    Personal history of other mental and behavioral disorders 11/06/2019    History of suicidal ideation    Personal history of other mental and behavioral disorders     History of mental disorder    Personal history of other specified conditions     History of tachycardia    Personal history of other specified conditions 11/06/2019    History of headache    Personal history of other specified conditions     History of snoring     Past Surgical History:   Procedure Laterality Date    OTHER SURGICAL HISTORY  06/18/2019    Hysterectomy    OTHER SURGICAL HISTORY  01/12/2021    Hemorrhoidectomy    OTHER SURGICAL HISTORY  05/15/2020    Tonsillectomy    OTHER SURGICAL HISTORY  01/24/2020    Endoscopy     Social History     Tobacco Use    Smoking status: Never    Smokeless tobacco: Never   Substance Use Topics    Alcohol use: Not Currently     family history includes Anxiety disorder in her father and mother; Depression in her father and mother; Hypertension in her father; Schizophrenia in an other family member; kidney stones in her mother.    Current Outpatient Medications:     acetaminophen (Tylenol) 325 mg tablet, Take by mouth every 6 hours., Disp: , Rfl:     aspirin-acetaminophen-caffeine (Excedrin Extra Strength) 250-250-65 mg tablet, Take by mouth every 6 hours if needed., Disp: , Rfl:     brimonidine 0.33 % gel with pump, Apply a pea-sized amount to face once daily, Disp: , Rfl:     cholecalciferol (Vitamin D-3) 50 mcg  "(2,000 unit) capsule, Take 1 capsule (50 mcg) by mouth once daily., Disp: , Rfl:     clonazePAM (KlonoPIN) 0.5 mg tablet, Take 1 tablet (0.5 mg) by mouth 3 times a day as needed., Disp: , Rfl:     L. acidophilus/Bifid. animalis 32 billion cell capsule, Take by mouth., Disp: , Rfl:     lamoTRIgine (LaMICtal) 150 mg tablet, TAKE 2 TABLETS BY MOUTH AT BEDTIME. TOTAL DOSE 300MG, Disp: , Rfl:     levothyroxine (Synthroid, Levoxyl) 50 mcg tablet, Take 1 tablet by mouth once daily, Disp: 90 tablet, Rfl: 0    lurasidone (Latuda) 40 mg tablet, TAKE 1 TABLET BY MOUTH IN THE EVENING WITH FOOD, Disp: , Rfl:     melatonin 10 mg capsule, Take by mouth., Disp: , Rfl:     ondansetron (Zofran) 8 mg tablet, Take by mouth., Disp: , Rfl:     sertraline (Zoloft) 100 mg tablet, Take 1 tablet (100 mg) by mouth once daily in the morning. Take before meals., Disp: , Rfl:     SUMAtriptan (Imitrex) 50 mg tablet, TAKE ONE TABLET BY MOUTH AT THE ONSET OF HEADACHE, CAN REPEAT IN 2 HOURS. MAX 4 TABLETS IN 1 DAYS, Disp: , Rfl:     tiZANidine (Zanaflex) 4 mg tablet, TAKE 1 TABLET BY MOUTH AT BEDTIME, Disp: 30 tablet, Rfl: 1    topiramate (Topamax) 100 mg tablet, Take 1 tablet (100 mg) by mouth 2 times a day., Disp: , Rfl:     Trulance tablet tablet, Take 1 tablet by mouth once daily, Disp: 90 tablet, Rfl: 0    valACYclovir (Valtrex) 500 mg tablet, TAKE 1 TABLET BY MOUTH ONCE DAILY DURING OUTBREAKS THEN TAKE 1 TABLET TWICE DAILY FOR 3 DAYS, Disp: , Rfl:     zolpidem (Ambien) 10 mg tablet, Take 1 tablet (10 mg) by mouth as needed at bedtime., Disp: , Rfl:   Allergies   Allergen Reactions    Hydromorphone Itching       Objective     /81   Pulse 95   Temp 36.3 °C (97.3 °F)   Ht 1.702 m (5' 7\")   Wt 107 kg (236 lb)   BMI 36.96 kg/m²     CONSTITUTIONAL:  No acute distress    MENTAL STATUS:  Awake, alert, fully oriented to self, place, and time, with present short-term memory, good awareness of recent events, normal attention span, " concentration, and fund of knowledge.    SPEECH AND LANGUAGE:  Can name and repeat, follows all commands, has no dysarthria    CRANIAL NERVES:  II-Vision present, visual fields full to confrontational testing    III/IV/VI--EOMs are present in all directions.  Pupils are symmetrically reactive in dim light.  No ptosis.    V--Normal facial sensation.    VII--No facial asymmetry.    VIII--Hearing present to voice bilaterally.    IX/X--Symmetric soft palate rise.    XI--Normal trapezius power bilaterally.    XII--Tongue protrudes without deviation.    MOTOR:  Normal power, tone, and bulk in both arms and both legs.    SENSORY:  Normal pin sensation in both arms and both legs without distal-proximal gradient, asymmetry, or spinal sensory level.    COORDINATION:  Normal finger-to-nose and heel-to-shin testing in both arms and both legs.    REFLEXES are normal and symmetric at the biceps, triceps, brachioradialis, patella, and ankle.  The plantar responses are flexor.    GAIT is normal, without steppage, ataxia, shuffling, or spasticity.      Adalberto Padgett Jr., M.D., FAAN

## 2024-01-23 ENCOUNTER — SPECIALTY PHARMACY (OUTPATIENT)
Dept: PHARMACY | Facility: CLINIC | Age: 44
End: 2024-01-23

## 2024-01-24 ENCOUNTER — TELEPHONE (OUTPATIENT)
Dept: OBSTETRICS AND GYNECOLOGY | Facility: CLINIC | Age: 44
End: 2024-01-24
Payer: COMMERCIAL

## 2024-01-24 NOTE — TELEPHONE ENCOUNTER
Pt verified by name and .  Pt calling for refill of diflcuan.  Pt states she takes it every week.  Pt is aware nurse will send message to William Perez for her to advise.  Pt has no questions at this time.

## 2024-01-26 ENCOUNTER — TELEPHONE (OUTPATIENT)
Dept: OBSTETRICS AND GYNECOLOGY | Facility: CLINIC | Age: 44
End: 2024-01-26
Payer: COMMERCIAL

## 2024-01-26 DIAGNOSIS — N76.0 RECURRENT VAGINITIS: Primary | ICD-10-CM

## 2024-01-26 DIAGNOSIS — M54.2 CERVICALGIA: ICD-10-CM

## 2024-01-26 DIAGNOSIS — G43.711 INTRACTABLE CHRONIC MIGRAINE WITHOUT AURA AND WITH STATUS MIGRAINOSUS: ICD-10-CM

## 2024-01-26 RX ORDER — TIZANIDINE 4 MG/1
4 TABLET ORAL NIGHTLY
Qty: 30 TABLET | Refills: 1 | Status: SHIPPED | OUTPATIENT
Start: 2024-01-26 | End: 2024-04-02 | Stop reason: SDUPTHER

## 2024-01-26 RX ORDER — FLUCONAZOLE 150 MG/1
TABLET ORAL
Qty: 16 TABLET | Refills: 0 | Status: SHIPPED | OUTPATIENT
Start: 2024-01-26 | End: 2024-05-24 | Stop reason: SDUPTHER

## 2024-01-26 RX ORDER — BUTALBITAL, ACETAMINOPHEN AND CAFFEINE 50; 325; 40 MG/1; MG/1; MG/1
1 TABLET ORAL EVERY 6 HOURS PRN
Qty: 15 TABLET | Refills: 3 | Status: SHIPPED | OUTPATIENT
Start: 2024-01-26 | End: 2024-05-23

## 2024-01-26 NOTE — TELEPHONE ENCOUNTER
Pt verified by name and .  Pt is aware of William Perez's message  William Perez, APRN-CNP  Nataly Quispe, RN  Caller: Unspecified (2 days ago,  1:20 PM)  I refilled the weekly suppression for another 4 months, then we should reevaluate  Pt has no questions at this time.

## 2024-02-16 DIAGNOSIS — E03.9 HYPOTHYROIDISM, UNSPECIFIED TYPE: ICD-10-CM

## 2024-02-16 RX ORDER — LEVOTHYROXINE SODIUM 50 UG/1
50 TABLET ORAL DAILY
Qty: 90 TABLET | Refills: 0 | Status: SHIPPED | OUTPATIENT
Start: 2024-02-16 | End: 2024-05-16

## 2024-02-22 ENCOUNTER — OFFICE VISIT (OUTPATIENT)
Dept: ORTHOPEDIC SURGERY | Facility: CLINIC | Age: 44
End: 2024-02-22
Payer: COMMERCIAL

## 2024-02-22 DIAGNOSIS — M77.11 LATERAL EPICONDYLITIS OF RIGHT ELBOW: Primary | ICD-10-CM

## 2024-02-22 PROCEDURE — 1036F TOBACCO NON-USER: CPT | Performed by: FAMILY MEDICINE

## 2024-02-22 PROCEDURE — 99024 POSTOP FOLLOW-UP VISIT: CPT | Performed by: FAMILY MEDICINE

## 2024-02-22 NOTE — PROGRESS NOTES
History of Present Illness   Chief Complaint   Patient presents with    Right Elbow - Follow-up     FUV R TENNIS ELBOW TENJET DOS 1/11/24       The patient is 43 y.o. left hand dominant female here for 6 week follow up status post Tenjet procedure for treatment of right elbow lateral epicondylitis.  Patient is doing well, continued improvement in symptoms over the past 5 weeks, she has been adhering to 5 pound weight restrictions, she has been doing occupational therapy exercises on her own, do formal OT prior to procedure, felt comfortable with home program.  Still has some mild discomfort but much better than she was prior to the procedure    Past Medical History:   Diagnosis Date    Borderline personality disorder (CMS/[facility]) 07/13/2020    Borderline personality disorder    Depression, unspecified 11/15/2022    Depression, unspecified depression type    Hyperparathyroidism, unspecified (CMS/[facility]) 11/06/2019    Hyperparathyroidism    Insomnia, unspecified 11/06/2019    Insomnia, unspecified type    Other specified personal risk factors, not elsewhere classified 11/06/2019    History of drug overdose    Personal history of diseases of the blood and blood-forming organs and certain disorders involving the immune mechanism     History of anemia    Personal history of other diseases of the digestive system 11/06/2019    History of irritable bowel syndrome    Personal history of other diseases of the digestive system 11/06/2019    History of hemorrhoids    Personal history of other diseases of the digestive system 11/06/2019    History of fatty infiltration of liver    Personal history of other diseases of the nervous system and sense organs 11/06/2019    History of restless legs syndrome    Personal history of other diseases of the nervous system and sense organs 11/06/2019    History of migraine    Personal history of other endocrine, nutritional and metabolic disease 11/06/2019    History of thyroid disease     Personal history of other infectious and parasitic diseases 11/07/2019    History of Helicobacter pylori infection    Personal history of other mental and behavioral disorders 11/06/2019    History of anxiety    Personal history of other mental and behavioral disorders 11/06/2019    History of suicidal ideation    Personal history of other mental and behavioral disorders     History of mental disorder    Personal history of other specified conditions     History of tachycardia    Personal history of other specified conditions 11/06/2019    History of headache    Personal history of other specified conditions     History of snoring       Medication Documentation Review Audit       Reviewed by Martín Laurent MA (Medical Assistant) on 02/22/24 at 1252      Medication Order Taking? Sig Documenting Provider Last Dose Status   acetaminophen (Tylenol) 325 mg tablet [id number] No Take by mouth every 6 hours. Historical Provider, MD Past Week Active   aspirin-acetaminophen-caffeine (Excedrin Extra Strength) 250-250-65 mg tablet [id number] No Take by mouth every 6 hours if needed. Historical Provider, MD Unknown Active   brimonidine 0.33 % gel with pump [id number] No Apply a pea-sized amount to face once daily Historical Provider, MD Unknown Active   butalbital-acetaminophen-caff -40 mg tablet [id number]  Take 1 tablet by mouth every 6 hours if needed for headaches. Max of 3 in one day Adalberto Padgett MD  Active   cholecalciferol (Vitamin D-3) 50 mcg (2,000 unit) capsule [id number] No Take 1 capsule (50 mcg) by mouth once daily. Historical Provider, MD More than a month Active   clonazePAM (KlonoPIN) 0.5 mg tablet [id number] No Take 1 tablet (0.5 mg) by mouth 3 times a day as needed. Dg Provider, MD 1/10/2024 Active   erenumab (Aimovig Autoinjector) 140 mg/mL injection [id number]  Inject 1 mL (140 mg) under the skin every 28 (twenty-eight) days. Adalberto Padgett MD  Active   fluconazole (Diflucan) 150  mg tablet [id number]  Take one dose weekly for long term suppression therapy of recurrent yeast William Perez APRN-CNP  Active   L. acidophilus/Bifid. animalis 32 billion cell capsule [id number] No Take by mouth. Historical Provider, MD More than a month Active   lamoTRIgine (LaMICtal) 150 mg tablet [id number] No TAKE 2 TABLETS BY MOUTH AT BEDTIME. TOTAL DOSE 300MG Historical Provider, MD 1/10/2024 Active   Discontinued 02/16/24 1144   levothyroxine (Synthroid, Levoxyl) 50 mcg tablet [id number]  Take 1 tablet (50 mcg) by mouth once daily. Kayli Camejo APRN-CNP  Active   lurasidone (Latuda) 40 mg tablet [id number] No TAKE 1 TABLET BY MOUTH IN THE EVENING WITH FOOD Historical Provider, MD 1/10/2024 Active   melatonin 10 mg capsule [id number] No Take by mouth. Historical Provider, MD Past Week Active   ondansetron (Zofran) 8 mg tablet [id number] No Take by mouth. Historical Provider, MD Past Month Active   sertraline (Zoloft) 100 mg tablet [id number] No Take 1 tablet (100 mg) by mouth once daily in the morning. Take before meals. Historical Provider, MD 1/11/2024 Active   SUMAtriptan (Imitrex) 50 mg tablet [id number] No TAKE ONE TABLET BY MOUTH AT THE ONSET OF HEADACHE, CAN REPEAT IN 2 HOURS. MAX 4 TABLETS IN 1 DAYS Historical Provider, MD Past Week Active   tiZANidine (Zanaflex) 4 mg tablet [id number]  Take 1 tablet (4 mg) by mouth once daily at bedtime. Adalberto Padgett MD  Active   topiramate (Topamax) 100 mg tablet [id number]  Take 2 tablets (200 mg) by mouth once daily at bedtime. Adalberto Padgett MD  Active   Trulance tablet tablet [id number] No Take 1 tablet by mouth once daily WINIFRED Stock-CNP Past Week Active   valACYclovir (Valtrex) 500 mg tablet [id number] No TAKE 1 TABLET BY MOUTH ONCE DAILY DURING OUTBREAKS THEN TAKE 1 TABLET TWICE DAILY FOR 3 DAYS Historical Provider, MD 1/11/2024 Active   zolpidem (Ambien) 10 mg tablet [id number] No Take 1 tablet (10 mg) by mouth as needed at bedtime. Historical Provider,  MD 1/10/2024 Active                    Allergies   Allergen Reactions    Hydromorphone Itching       Social History     Socioeconomic History    Marital status:      Spouse name: Not on file    Number of children: Not on file    Years of education: Not on file    Highest education level: Not on file   Occupational History    Not on file   Tobacco Use    Smoking status: Never    Smokeless tobacco: Never   Substance and Sexual Activity    Alcohol use: Not Currently    Drug use: Never    Sexual activity: Not on file   Other Topics Concern    Not on file   Social History Narrative    Not on file     Social Determinants of Health     Financial Resource Strain: Not on file   Food Insecurity: Not on file   Transportation Needs: Not on file   Physical Activity: Not on file   Stress: Not on file   Social Connections: Not on file   Intimate Partner Violence: Not on file   Housing Stability: Not on file       Past Surgical History:   Procedure Laterality Date    OTHER SURGICAL HISTORY  06/18/2019    Hysterectomy    OTHER SURGICAL HISTORY  01/12/2021    Hemorrhoidectomy    OTHER SURGICAL HISTORY  05/15/2020    Tonsillectomy    OTHER SURGICAL HISTORY  01/24/2020    Endoscopy             Physical Exam:    Right elbow: Healed incision at lateral elbow, there is no redness warmth or drainage.  There is some mild residual tenderness to palpation at common extensor tendon origin at the lateral epicondyle.  She has full range of motion at the elbow.  There is still some mild exacerbation of lateral elbow pain with resisted wrist and third finger extension but improved.  Upper extremity is neurovascular intact    Assessment/Plan     1. Lateral epicondylitis of right elbow                Patient is 6 weeks status post Tenjet for treatment of right elbow lateral epicondylitis, doing well,   Still having some mild residual discomfort and tenderness to palpation, I am hopeful that symptoms will continue to improve over the next 6  weeks or so.  I did instruct her to begin gradually increasing her activity, weight as tolerated by symptoms, using pain as her guide.  She will continue with home exercises.  She will plan to follow-up as symptoms dictate.

## 2024-02-28 ENCOUNTER — TELEPHONE (OUTPATIENT)
Dept: PRIMARY CARE | Facility: CLINIC | Age: 44
End: 2024-02-28
Payer: COMMERCIAL

## 2024-02-28 DIAGNOSIS — N76.0 ACUTE VAGINITIS: Primary | ICD-10-CM

## 2024-02-28 RX ORDER — METRONIDAZOLE 500 MG/1
500 TABLET ORAL 2 TIMES DAILY
Qty: 14 TABLET | Refills: 0 | Status: SHIPPED | OUTPATIENT
Start: 2024-02-28 | End: 2024-03-06

## 2024-03-01 ENCOUNTER — APPOINTMENT (OUTPATIENT)
Dept: DERMATOLOGY | Facility: CLINIC | Age: 44
End: 2024-03-01
Payer: COMMERCIAL

## 2024-03-08 ENCOUNTER — OFFICE VISIT (OUTPATIENT)
Dept: DERMATOLOGY | Facility: CLINIC | Age: 44
End: 2024-03-08
Payer: COMMERCIAL

## 2024-03-08 VITALS — SYSTOLIC BLOOD PRESSURE: 128 MMHG | DIASTOLIC BLOOD PRESSURE: 85 MMHG | HEART RATE: 85 BPM

## 2024-03-08 DIAGNOSIS — C44.41 BASAL CELL CARCINOMA (BCC) OF SKIN OF NECK: ICD-10-CM

## 2024-03-08 PROCEDURE — 13131 CMPLX RPR F/C/C/M/N/AX/G/H/F: CPT | Performed by: DERMATOLOGY

## 2024-03-08 PROCEDURE — 1036F TOBACCO NON-USER: CPT | Performed by: DERMATOLOGY

## 2024-03-08 PROCEDURE — 99214 OFFICE O/P EST MOD 30 MIN: CPT | Performed by: DERMATOLOGY

## 2024-03-08 PROCEDURE — 17311 MOHS 1 STAGE H/N/HF/G: CPT | Performed by: DERMATOLOGY

## 2024-03-08 NOTE — PROGRESS NOTES
Office Visit Note  Date: 3/8/2024  Surgeon:  Annika Gambino MD  Office Location: [address]  [facility] [address]  Dept: [phone]  Dept Fax: [phone]  Referring Provider: Jax Espitia MD  [address] [place] [address]   Anaid Candice [place], [facility] [address]  [place] [id number]    Cesar Guevara is a 43 y.o. female who presents for the following: MOHS Surgery    According to the patient, the lesion has been present for approximately greater than 1 year at the time of diagnosis.  The lesion is not causing symptoms.  The lesion has not been treated previously.    The patient does not have a pacemaker / defibrillator.  The patient does not have a heart valve / joint replacement.    The patient is not on blood thinners.  The patient does not have a history of hepatitis B or C.  The patient does not have a history of HIV.  The patient does not have a history of immunosuppression (e.g. organ transplantation, malignancy, medications)    Review of Systems:  No other skin or systemic complaints other than what is documented elsewhere in the note.    MEDICAL HISTORY: clinically relevant history including significant past medical history, medications and allergies was reviewed and documented in Epic.    Objective   Well appearing patient in no apparent distress; mood and affect are within normal limits.  Vital signs: See record.  Noted on the Left Anterior Superior Neck  Is a 0.6 x 0.8 cm scar    The patient confirmed the identified site.    Discussion:  The nature of the diagnosis was explained. The lesion is a skin cancer.  It has a risk of local growth and distant spread. The condition is associated with sun exposure.  Warning signs of non-melanoma skin cancer discussed. Patient was instructed to perform monthly self skin examination.  We recommended that the patient have regular full skin exams given an increased risk of subsequent skin cancers. The patient was  instructed to use sun protective behaviors including use of broad spectrum sunscreens and sun protective clothing to reduce risk of skin cancers.      Risks, benefits, side effects of Mohs surgery were discussed with patient and the patient voiced understanding.  It was explained that even though the cure rate of Mohs is very high it is not 100%. Risks of surgery including but not limited to bleeding, infection, numbness, nerve damage, and scar were reviewed.  Discussion included wound care requirements, activity restrictions, likely scar outcome and time to heal.     After Mohs surgery, the defect may need to be repaired surgically and the scar may be longer than the original lesion.  Reconstruction options, risks, and benefits were reviewed including second intention healing, linear repair (4-1 ratio was explained), local flaps, skin grafts, cartilage grafts and interpolation flaps (the need for multiple surgeries was explained). Possible outcomes were reviewed including likely scar appearance, failure of flap survival, infection, bleeding and the need for revision surgery.     The pathology was reviewed, the photograph was reviewed, and the referring physician's note was reviewed.    Patient elected for Mohs surgery.

## 2024-03-08 NOTE — PROGRESS NOTES
Mohs Surgery Operative Note    Date of Surgery:  3/8/2024  Surgeon:  Annika Gambino MD  Office Location: [address]   [facility] [address]  [facility] [address]  Dept: [phone]  Dept Fax: [phone]  Referring Provider: Jax Espitia MD  [address] [place], [place] [id number]      Assessment/Plan   Pre-procedure:   Obtained informed consent: written from patient  The surgical site was identified and confirmed with the patient.     Intra-operative:   Audible time out called at : 10:32 AM 03/08/24  by: Cindy Huynh RN   Verified patient name, birthdate, site, specimen bottle label & requisition.    The planned procedure(s) was again reviewed with the patient. The risks of bleeding, infection, nerve damage and scarring were reviewed. Written authorization was obtained. The patient identity, surgical site, and planned procedure(s) were verified. The provider acted as both surgeon and pathologist.     Basal cell carcinoma (BCC) of skin of neck  Left Anterior Superior Neck  Mohs surgery  Consent obtained: written    Universal Protocol:  Procedure explained and questions answered to patient or proxy's satisfaction: Yes    Test results available and properly labeled: Yes    Pathology report reviewed: Yes    External notes reviewed: Yes    Photo or diagram used for site identification: Yes    Site/side marked: Yes    Slide independently reviewed by Mohs surgeon: Yes    Immediately prior to procedure a time out was called: Yes    Patient identity confirmed: verbally with patient  Preparation: Patient was prepped and draped in usual sterile fashion      Anticoagulation:  Is the patient taking prescription anticoagulant and/or aspirin prescribed/recommended by a physician? No    Was the anticoagulation regimen changed prior to Mohs? No      Anesthesia:  Anesthesia method: local infiltration  Local anesthetic: lidocaine 2% WITH epi    Procedure  Details:  Biopsy accession number: [id number] (outside path)  Date of biopsy: 9/27/2023  Pre-Op diagnosis: basal cell carcinoma  BCC subtype: nodular  Surgery side: left  Surgical site (from skin exam): Left Anterior Superior Neck  Pre-operative length (cm): 0.6  Pre-operative width (cm): 0.8  Indications for Mohs surgery: anatomic location where tissue conservation is critical  Previously treated? No      Micrographic Surgery Details:  Post-operative length (cm): 1  Post-operative width (cm): 1.2  Number of Mohs stages: 1    Stage 1     Comments: The patient was brought into the operating room and placed in the procedure chair in the appropriate position.  The area positive by previous biopsy was identified and confirmed with the patient. The area of clinically obvious tumor was debulked using a curette and/or scalpel as needed. An incision was made following the Mohs approach through the skin. The specimen was taken to the lab, divided into 2 piece(s) and appropriately chromacoded and processed.  Tumor features identified on Mohs section: no tumor identified  Depth of defect: subcutaneous fat    Patient tolerance of procedure: tolerated well, no immediate complications    Reconstruction:  Was the defect reconstructed? Yes    Was reconstruction performed by the same Mohs surgeon? Yes    Setting of reconstruction: outpatient office  When was reconstruction performed? same day  Type of reconstruction: linear  Linear reconstruction: complex  Subcutaneous Layers (Deep Stitches)   Suture size:  5-0  Suture type:  Vicryl  Stitches:  Buried vertical mattress  Fine/surface layer approximation (top stitches)   Epidermal/Superficial suture size:  5-0  Epidermal/Superficial suture type:  Fast-absorbing gut  Stitches: simple running    Hemostasis achieved with: electrodesiccation  Outcome: patient tolerated procedure well with no complications    Post-procedure details: sterile dressing applied and wound care instructions given     Dressing type: pressure dressing      Complex Linear Repair - Wide Undermining:  Given the location and size of the defect, it was determined that a complex layered linear closure was required to restore normal anatomy and function. The repair was considered complex because extensive undermining was required and performed. The amount of undermining performed was greater than the maximum width of the defect as measured perpendicular to the closure line along at least one entire edge of the defect. Standing cutaneous cones were removed using Burow's triangles. The wound edges were brought into close approximation with buried vertical mattress sutures. The remainder of the wound was then closed with epidermal top sutures.    The final repair measured 2 cm              Wound care was discussed, and the patient was given written post-operative wound care instructions.      The patient will follow up with Annika Gambino MD as needed for any post operative problems or concerns, and will follow up with their primary dermatologist as scheduled.

## 2024-03-12 ENCOUNTER — CLINICAL SUPPORT (OUTPATIENT)
Dept: SLEEP MEDICINE | Facility: CLINIC | Age: 44
End: 2024-03-12
Payer: COMMERCIAL

## 2024-03-12 DIAGNOSIS — G47.61 PERIODIC LIMB MOVEMENT DISORDER: ICD-10-CM

## 2024-03-12 DIAGNOSIS — G47.33 OBSTRUCTIVE SLEEP APNEA (ADULT) (PEDIATRIC): ICD-10-CM

## 2024-03-12 DIAGNOSIS — Z98.84 BARIATRIC SURGERY STATUS: ICD-10-CM

## 2024-03-12 PROCEDURE — 95810 POLYSOM 6/> YRS 4/> PARAM: CPT | Performed by: INTERNAL MEDICINE

## 2024-03-13 VITALS
WEIGHT: 233.69 LBS | DIASTOLIC BLOOD PRESSURE: 85 MMHG | BODY MASS INDEX: 36.68 KG/M2 | HEIGHT: 67 IN | SYSTOLIC BLOOD PRESSURE: 135 MMHG

## 2024-03-13 ASSESSMENT — PAIN SCALES - GENERAL: PAINLEVEL: 0-NO PAIN

## 2024-03-13 NOTE — PROGRESS NOTES
[facility] TECH NOTE:     Patient: Flower Guevara   MRN//AGE: [id number]  1980  43 y.o.   Technologist: VARSHA EDWARDS   Room: 4   Service Date: 3/13/2024        Sleep Testing Location: [facility]     [place]: 8, 41    TECHNOLOGIST SLEEP STUDY PROCEDURE NOTE:   This sleep study is being conducted according to the policies and procedures outlined by the AAS accreditation standards.  The sleep study procedure and processes involved during this appointment was explained to the patient/patient’s family, questions were answered. The patient/family verbalized understanding.      The patient is a 43 y.o. year old female scheduled for aDiagnostic PSG Split night with montage of: PSG she arrived for her appointment.      The study that was ultimately completed was aDiagnostic PSG Split night with montage of: PSG    The full study Was completed.  Patient questionnaires completed?: yes     Consents signed? yes    Initial Fall Risk Screening:     Flower has not fallen in the last 6 months. her did not result in injury. Flower does not have a fear of falling. He does not need assistance with sitting, standing, or walking. she does not need assistance walking in her home. she does not need assistance in an unfamiliar setting. The patient is notusing an assistive device.     Brief Study observations: n/a    Deviation to order/protocol and reason: n/a     If PAP, which was preferred mask/pressure/mode: n/a      Other:None    After the procedure, the patient/family was informed to ensure followup with ordering clinician for testing results.      Technologist: VARSHA EDWARDS

## 2024-04-02 DIAGNOSIS — M54.2 CERVICALGIA: ICD-10-CM

## 2024-04-02 DIAGNOSIS — G43.711 INTRACTABLE CHRONIC MIGRAINE WITHOUT AURA AND WITH STATUS MIGRAINOSUS: ICD-10-CM

## 2024-04-02 DIAGNOSIS — B00.9 HSV-2 (HERPES SIMPLEX VIRUS 2) INFECTION: Primary | ICD-10-CM

## 2024-04-02 RX ORDER — VALACYCLOVIR HYDROCHLORIDE 500 MG/1
TABLET, FILM COATED ORAL
Qty: 90 TABLET | Refills: 1 | Status: SHIPPED | OUTPATIENT
Start: 2024-04-02

## 2024-04-03 RX ORDER — TOPIRAMATE 100 MG/1
200 TABLET, FILM COATED ORAL NIGHTLY
Qty: 180 TABLET | Refills: 3 | Status: SHIPPED | OUTPATIENT
Start: 2024-04-03 | End: 2025-04-03

## 2024-04-03 RX ORDER — TIZANIDINE 4 MG/1
4 TABLET ORAL NIGHTLY
Qty: 30 TABLET | Refills: 1 | Status: SHIPPED | OUTPATIENT
Start: 2024-04-03

## 2024-04-09 ENCOUNTER — TELEPHONE (OUTPATIENT)
Dept: PRIMARY CARE | Facility: CLINIC | Age: 44
End: 2024-04-09
Payer: COMMERCIAL

## 2024-04-17 NOTE — PROGRESS NOTES
Subjective     Flower Guevara is a 43 y.o. female who presents for the following: Skin Exam.  She notes area new red spot on her left lower chest, which has been present for approximately 1 month.  She reports it does not hurt, itch, or bleed, but it has not healed.  She also notes recent pimple breakouts on her back.      Review of Systems:  No other skin or systemic complaints other than what is documented elsewhere in the note.    The following portions of the chart were reviewed this encounter and updated as appropriate:       Skin Cancer History  No skin cancer on file.    Specialty Problems          Dermatology Problems    Hair loss    Rosacea       Past Dermatologic / Past Relevant Medical History:    - history of BCC on left anterior superior neck diagnosed at initial visit on 9/27/23 s/p Mohs surgery by Dr. Gambino on 3/8/24  - AKs  - rosacea  - no h/o melanoma    Family History:    Father - metastatic melanoma    Social History:    The patient states her daughter will be spending the weekend with her    Allergies:  Hydromorphone    Current Medications / CAM's:    Current Outpatient Medications:     acetaminophen (Tylenol) 325 mg tablet, Take by mouth every 6 hours., Disp: , Rfl:     aspirin-acetaminophen-caffeine (Excedrin Extra Strength) 250-250-65 mg tablet, Take by mouth every 6 hours if needed., Disp: , Rfl:     brimonidine 0.33 % gel with pump, Apply a pea-sized amount to face once daily, Disp: , Rfl:     butalbital-acetaminophen-caff -40 mg tablet, Take 1 tablet by mouth every 6 hours if needed for headaches. Max of 3 in one day, Disp: 15 tablet, Rfl: 3    cholecalciferol (Vitamin D-3) 50 mcg (2,000 unit) capsule, Take 1 capsule (50 mcg) by mouth once daily., Disp: , Rfl:     clonazePAM (KlonoPIN) 0.5 mg tablet, Take 1 tablet (0.5 mg) by mouth 3 times a day as needed., Disp: , Rfl:     erenumab (Aimovig Autoinjector) 140 mg/mL injection, Inject 1 mL (140 mg) under the skin every 28  (twenty-eight) days., Disp: 1 mL, Rfl: 3    fluconazole (Diflucan) 150 mg tablet, Take one dose weekly for long term suppression therapy of recurrent yeast, Disp: 16 tablet, Rfl: 0    L. acidophilus/Bifid. animalis 32 billion cell capsule, Take by mouth., Disp: , Rfl:     lamoTRIgine (LaMICtal) 150 mg tablet, TAKE 2 TABLETS BY MOUTH AT BEDTIME. TOTAL DOSE 300MG, Disp: , Rfl:     levothyroxine (Synthroid, Levoxyl) 50 mcg tablet, Take 1 tablet (50 mcg) by mouth once daily., Disp: 90 tablet, Rfl: 0    lurasidone (Latuda) 40 mg tablet, TAKE 1 TABLET BY MOUTH IN THE EVENING WITH FOOD, Disp: , Rfl:     melatonin 10 mg capsule, Take by mouth., Disp: , Rfl:     ondansetron (Zofran) 8 mg tablet, Take by mouth., Disp: , Rfl:     sertraline (Zoloft) 100 mg tablet, Take 1 tablet (100 mg) by mouth once daily in the morning. Take before meals., Disp: , Rfl:     SUMAtriptan (Imitrex) 50 mg tablet, TAKE ONE TABLET BY MOUTH AT THE ONSET OF HEADACHE, CAN REPEAT IN 2 HOURS. MAX 4 TABLETS IN 1 DAYS, Disp: , Rfl:     tiZANidine (Zanaflex) 4 mg tablet, Take 1 tablet (4 mg) by mouth once daily at bedtime., Disp: 30 tablet, Rfl: 1    topiramate (Topamax) 100 mg tablet, Take 2 tablets (200 mg) by mouth once daily at bedtime., Disp: 180 tablet, Rfl: 3    Trulance tablet tablet, Take 1 tablet by mouth once daily, Disp: 90 tablet, Rfl: 0    valACYclovir (Valtrex) 500 mg tablet, TAKE 1 TABLET BY MOUTH ONCE DAILY. DURING OUTBREAKS, TAKE 1 TABLET TWICE DAILY FOR 3 DAYS, Disp: 90 tablet, Rfl: 1    zolpidem (Ambien) 10 mg tablet, Take 1 tablet (10 mg) by mouth as needed at bedtime., Disp: , Rfl:      Objective   Well appearing patient in no apparent distress; mood and affect are within normal limits.    A full examination was performed including scalp, face, eyes, ears, nose, lips, neck, chest, axillae, abdomen, back, bilateral upper extremities, and bilateral lower extremities. All findings within normal limits unless otherwise noted  below.    Assessment/Plan   1. Neoplasm of uncertain behavior of skin  Left Medial Lower Chest  5 mm pink, shiny papule           Lesion biopsy  Type of biopsy: tangential    Informed consent: discussed and consent obtained    Timeout: patient name, date of birth, surgical site, and procedure verified    Procedure prep:  Patient was prepped and draped  Anesthesia: the lesion was anesthetized in a standard fashion    Anesthetic:  1% lidocaine w/ epinephrine 1-100,000 local infiltration  Instrument used: flexible razor blade    Hemostasis achieved with: aluminum chloride    Outcome: patient tolerated procedure well    Post-procedure details: wound care instructions given      Specimen 1 - Dermatopathology- DERM LAB  Differential Diagnosis: BLK v BCC  Check Margins Yes/No?:    Comments:    Dermpath Lab: Routine Histopathology (formalin-fixed tissue)    2. Actinic keratosis (3)  Head - Anterior (Face) (3)  On her right forehead, right lateral upper cheek, and left mid cheek, there are 3 erythematous, gritty, scaly macules     Actinic Keratoses -right forehead, right lateral upper cheek, and left mid cheek.  The pre-cancerous nature of these lesions and treatment options were discussed with the patient today.  At this time, I recommend treatment with liquid nitrogen cryotherapy.  The patient expressed understanding, is in agreement with this plan, and wishes to proceed with cryotherapy today.    Destr of lesion - Head - Anterior (Face)  Complexity: simple    Destruction method: cryotherapy    Informed consent: discussed and consent obtained    Lesion destroyed using liquid nitrogen: Yes    Cryotherapy cycles:  1  Outcome: patient tolerated procedure well with no complications    Post-procedure details: wound care instructions given      3. Melanocytic nevus of trunk  Scattered on the patient's face, neck, trunk, and extremities, there are several small, round- to oval-shaped, brown-pigmented and pink-colored, symmetric,  uniform-appearing macules and dome-shaped papules    Clinically benign- to slightly atypical-appearing nevi - the clinically benign- to slightly atypical-appearing nature of the patient's nevi was discussed with the patient today.  None of the patient's nevi meet threshold for biopsy today.  I emphasized the importance of performing monthly self-skin exams using the ABCDs of monitoring moles, which were reviewed with the patient today and an informational hand-out provided.  I also emphasized the importance of sun avoidance and sun protection with daily sunscreen use.  The patient expressed understanding and is in agreement with this plan.    4. Lentigo  Photodistributed  Multiple tan- to light brown-colored, round- to oval-shaped, symmetric and uniform-appearing macules and small patches consistent with lentigines scattered in sun-exposed areas.    Solar Lentigines and photodamage.  The clinically benign-appearing nature of these lesions and their relation to chronic sun exposure were discussed with the patient today and reassurance provided.  None of these lesions meet threshold for biopsy today, and thus no treatment is medically indicated for these lesions at this time.  The signs and symptoms of skin cancer were reviewed and the patient was advised to practice sun protection and sun avoidance, use daily sunscreen, and perform regular self skin exams.  The patient was instructed to monitor these lesions for any changes, such as in size, shape, or color, or associated symptoms and to call our office to schedule a return visit for re-evaluation if any such changes or symptoms are noticed in the future.  The patient expressed understanding and is in agreement with this plan.    5. Hemangioma of skin  Scattered on the patient's face, neck, trunk, and extremities, there are multiple small, round, cherry red- to purplish-colored, symmetric, uniform, vascular-appearing macules and papules    Cherry Angiomas - the  benign nature of these vascular lesions was discussed with the patient today and reassurance provided.  No treatment is medically indicated for these lesions at this time.    6. Folliculitis  Mid Back  Scattered on the patient's back, there are several follicular-based erythematous, inflammatory papules and pustules    Folliculitis -back.  The bacterial nature of this condition and treatment options were discussed with the patient today.  At this time, I recommend topical antibiotic therapy with Clindamycin 1% lotion, which the patient was instructed to apply twice daily to the affected areas or up to 3-4 times per day as needed for active lesions.  The risks, benefits, and side effects of this medication were discussed.  The patient expressed understanding and is in agreement with this plan.    7. History of nonmelanoma skin cancer  On the patient's left anterior superior neck, there is a well-healed scar with no evidence of recurrent growth on exam today.    History of basal cell carcinoma and actinic keratoses, family history of melanoma, and photodamage.  There is no evidence of recurrence at the site of the patient's recently treated BCC on her left anterior superior neck on exam today.  The signs and symptoms of skin cancer were reviewed and the patient was advised to practice sun protection and sun avoidance, use daily sunscreen, and perform regular self skin exams.  I will have the patient return to our office in 6 months, pending the above biopsy result, for routine follow-up and skin exam, and the patient was instructed to call our office should the patient notice any new, changing, symptomatic, or otherwise concerning skin lesions before then.  The patient expressed understanding and is in agreement with this plan.    8. Diffuse photodamage of skin  Photodistributed  Diffuse photodamage with actinic changes with telangiectasia and mottled pigmentation in sun-exposed areas.    Photodamage.  The signs and  symptoms of skin cancer were reviewed and the patient was advised to practice sun protection and sun avoidance, use daily sunscreen, and perform regular self skin exams.  Sun protection was discussed, including avoiding the mid-day sun, wearing a sunscreen with SPF at least 50, and stressing the need for reapplication of sunscreen and applying more than they think they need.

## 2024-04-18 ENCOUNTER — OFFICE VISIT (OUTPATIENT)
Dept: DERMATOLOGY | Facility: CLINIC | Age: 44
End: 2024-04-18
Payer: COMMERCIAL

## 2024-04-18 DIAGNOSIS — L73.9 FOLLICULITIS: ICD-10-CM

## 2024-04-18 DIAGNOSIS — L57.0 ACTINIC KERATOSIS: ICD-10-CM

## 2024-04-18 DIAGNOSIS — L81.4 LENTIGO: ICD-10-CM

## 2024-04-18 DIAGNOSIS — L57.8 DIFFUSE PHOTODAMAGE OF SKIN: ICD-10-CM

## 2024-04-18 DIAGNOSIS — D48.5 NEOPLASM OF UNCERTAIN BEHAVIOR OF SKIN: Primary | ICD-10-CM

## 2024-04-18 DIAGNOSIS — Z85.828 HISTORY OF NONMELANOMA SKIN CANCER: ICD-10-CM

## 2024-04-18 DIAGNOSIS — D22.5 MELANOCYTIC NEVUS OF TRUNK: ICD-10-CM

## 2024-04-18 DIAGNOSIS — D18.01 HEMANGIOMA OF SKIN: ICD-10-CM

## 2024-04-18 PROCEDURE — 17003 DESTRUCT PREMALG LES 2-14: CPT | Performed by: DERMATOLOGY

## 2024-04-18 PROCEDURE — 99214 OFFICE O/P EST MOD 30 MIN: CPT | Performed by: DERMATOLOGY

## 2024-04-18 PROCEDURE — 17000 DESTRUCT PREMALG LESION: CPT | Performed by: DERMATOLOGY

## 2024-04-18 PROCEDURE — 11102 TANGNTL BX SKIN SINGLE LES: CPT | Performed by: DERMATOLOGY

## 2024-04-18 PROCEDURE — 88305 TISSUE EXAM BY PATHOLOGIST: CPT | Performed by: DERMATOLOGY

## 2024-04-18 RX ORDER — CLINDAMYCIN PHOSPHATE 10 UG/ML
LOTION TOPICAL 2 TIMES DAILY
Qty: 60 ML | Refills: 11 | Status: SHIPPED | OUTPATIENT
Start: 2024-04-18 | End: 2025-04-18

## 2024-04-18 ASSESSMENT — DERMATOLOGY QUALITY OF LIFE (QOL) ASSESSMENT: ARE THERE EXCLUSIONS OR EXCEPTIONS FOR THE QUALITY OF LIFE ASSESSMENT: NO

## 2024-04-18 ASSESSMENT — DERMATOLOGY PATIENT ASSESSMENT
ARE YOU ON BIRTH CONTROL: NO
DO YOU USE SUNSCREEN: OCCASIONALLY
DO YOU USE A TANNING BED: NO
DO YOU HAVE IRREGULAR MENSTRUAL CYCLES: NO
DO YOU HAVE ANY NEW OR CHANGING LESIONS: YES
ARE YOU TRYING TO GET PREGNANT: NO

## 2024-04-19 ENCOUNTER — APPOINTMENT (OUTPATIENT)
Dept: DERMATOLOGY | Facility: CLINIC | Age: 44
End: 2024-04-19
Payer: COMMERCIAL

## 2024-04-22 LAB
LABORATORY COMMENT REPORT: NORMAL
PATH REPORT.FINAL DX SPEC: NORMAL
PATH REPORT.GROSS SPEC: NORMAL
PATH REPORT.MICROSCOPIC SPEC OTHER STN: NORMAL
PATH REPORT.RELEVANT HX SPEC: NORMAL
PATH REPORT.TOTAL CANCER: NORMAL

## 2024-05-01 ENCOUNTER — APPOINTMENT (OUTPATIENT)
Dept: PRIMARY CARE | Facility: CLINIC | Age: 44
End: 2024-05-01
Payer: COMMERCIAL

## 2024-05-16 DIAGNOSIS — E03.9 HYPOTHYROIDISM, UNSPECIFIED TYPE: ICD-10-CM

## 2024-05-16 RX ORDER — LEVOTHYROXINE SODIUM 50 UG/1
50 TABLET ORAL DAILY
Qty: 90 TABLET | Refills: 0 | Status: SHIPPED | OUTPATIENT
Start: 2024-05-16 | End: 2024-06-11

## 2024-05-23 ENCOUNTER — OFFICE VISIT (OUTPATIENT)
Dept: NEUROLOGY | Facility: CLINIC | Age: 44
End: 2024-05-23
Payer: COMMERCIAL

## 2024-05-23 VITALS
SYSTOLIC BLOOD PRESSURE: 126 MMHG | TEMPERATURE: 97.3 F | HEART RATE: 76 BPM | DIASTOLIC BLOOD PRESSURE: 77 MMHG | WEIGHT: 236 LBS | BODY MASS INDEX: 37.04 KG/M2 | HEIGHT: 67 IN

## 2024-05-23 DIAGNOSIS — G43.711 INTRACTABLE CHRONIC MIGRAINE WITHOUT AURA AND WITH STATUS MIGRAINOSUS: Primary | ICD-10-CM

## 2024-05-23 PROCEDURE — 99213 OFFICE O/P EST LOW 20 MIN: CPT | Performed by: PSYCHIATRY & NEUROLOGY

## 2024-05-23 PROCEDURE — 1036F TOBACCO NON-USER: CPT | Performed by: PSYCHIATRY & NEUROLOGY

## 2024-05-23 NOTE — PROGRESS NOTES
NEUROLOGY OUTPATIENT FOLLOW-UP NOTE    Assessment/Plan   Diagnoses and all orders for this visit:  Intractable chronic migraine without aura and with status migrainosus      IMPRESSION:  Intractable migraine    PLAN:  Will continue appeal process for Aimovig, given multiple prophylaxis failures.  To continue topiramate in the meantime for what prophylaxis it offers, to continue tizanidine for the cervical component to the headache, and sumatriptan at headache onset.      Adalberto Padgett Jr., M.D., FAAN   ----------    Subjective     Flower Guevara is a 43 y.o. year old female here for follow-up.    Headache frequency as below.  Has not been able to start CGRP inhibitor because of insurance company recalcitrance.  She denies new focal neurological symptoms including dysarthria, dysphagia, diplopia, focal weakness, focal sensory change, ataxia, vertigo, or bowel/bladder incontinence, among others.      HEADACHE HISTORY:     Headache type: Unilateral or bilateral (usually holocranial) pounding with photophobia, phonophobia, nausea, and emesis.      Number of headache days per week/month: 4-5 days a week (16-20 days a month)     Number of months with headache frequency: 10 months     Abortive medications tried for migraine: Excedrin Migraine, OTC acetaminophen, OTC ibuprofen, OTC naproxen, ASA, with only the Excedrin Migraine helpful, sumatriptan (helpful but runs out early), Ubrelvy (unhelpful), butalbital (not helpful)     Prophylactic medications tried for migraine: amitriptyline (unhelpful), venlafaxine (unhelpful), propranolol (unhelpful), topiramate (currently on, only partially helpful)    Past Medical History:   Diagnosis Date    Borderline personality disorder (Multi) 07/13/2020    Borderline personality disorder    Depression, unspecified 11/15/2022    Depression, unspecified depression type    Hyperparathyroidism, unspecified (Multi) 11/06/2019    Hyperparathyroidism    Insomnia, unspecified  11/06/2019    Insomnia, unspecified type    Other specified personal risk factors, not elsewhere classified 11/06/2019    History of drug overdose    Personal history of diseases of the blood and blood-forming organs and certain disorders involving the immune mechanism     History of anemia    Personal history of other diseases of the digestive system 11/06/2019    History of irritable bowel syndrome    Personal history of other diseases of the digestive system 11/06/2019    History of hemorrhoids    Personal history of other diseases of the digestive system 11/06/2019    History of fatty infiltration of liver    Personal history of other diseases of the nervous system and sense organs 11/06/2019    History of restless legs syndrome    Personal history of other diseases of the nervous system and sense organs 11/06/2019    History of migraine    Personal history of other endocrine, nutritional and metabolic disease 11/06/2019    History of thyroid disease    Personal history of other infectious and parasitic diseases 11/07/2019    History of Helicobacter pylori infection    Personal history of other mental and behavioral disorders 11/06/2019    History of anxiety    Personal history of other mental and behavioral disorders 11/06/2019    History of suicidal ideation    Personal history of other mental and behavioral disorders     History of mental disorder    Personal history of other specified conditions     History of tachycardia    Personal history of other specified conditions 11/06/2019    History of headache    Personal history of other specified conditions     History of snoring     Past Surgical History:   Procedure Laterality Date    OTHER SURGICAL HISTORY  06/18/2019    Hysterectomy    OTHER SURGICAL HISTORY  01/12/2021    Hemorrhoidectomy    OTHER SURGICAL HISTORY  05/15/2020    Tonsillectomy    OTHER SURGICAL HISTORY  01/24/2020    Endoscopy     Social History     Tobacco Use    Smoking status: Never     Smokeless tobacco: Never   Substance Use Topics    Alcohol use: Not Currently     family history includes Anxiety disorder in her father and mother; Depression in her father and mother; Hypertension in her father; Schizophrenia in an other family member; kidney stones in her mother.    Current Outpatient Medications:     acetaminophen (Tylenol) 325 mg tablet, Take by mouth every 6 hours., Disp: , Rfl:     aspirin-acetaminophen-caffeine (Excedrin Extra Strength) 250-250-65 mg tablet, Take by mouth every 6 hours if needed., Disp: , Rfl:     brimonidine 0.33 % gel with pump, Apply a pea-sized amount to face once daily, Disp: , Rfl:     butalbital-acetaminophen-caff -40 mg tablet, Take 1 tablet by mouth every 6 hours if needed for headaches. Max of 3 in one day, Disp: 15 tablet, Rfl: 3    cholecalciferol (Vitamin D-3) 50 mcg (2,000 unit) capsule, Take 1 capsule (50 mcg) by mouth once daily., Disp: , Rfl:     clindamycin (Cleocin T) 1 % lotion, Apply topically 2 times a day., Disp: 60 mL, Rfl: 11    clonazePAM (KlonoPIN) 0.5 mg tablet, Take 1 tablet (0.5 mg) by mouth 3 times a day as needed., Disp: , Rfl:     fluconazole (Diflucan) 150 mg tablet, Take one dose weekly for long term suppression therapy of recurrent yeast, Disp: 16 tablet, Rfl: 0    L. acidophilus/Bifid. animalis 32 billion cell capsule, Take by mouth., Disp: , Rfl:     lamoTRIgine (LaMICtal) 150 mg tablet, TAKE 2 TABLETS BY MOUTH AT BEDTIME. TOTAL DOSE 300MG, Disp: , Rfl:     levothyroxine (Synthroid, Levoxyl) 50 mcg tablet, TAKE 1 TABLET BY MOUTH ONCE DAILY., Disp: 90 tablet, Rfl: 0    lurasidone (Latuda) 40 mg tablet, TAKE 1 TABLET BY MOUTH IN THE EVENING WITH FOOD, Disp: , Rfl:     melatonin 10 mg capsule, Take by mouth., Disp: , Rfl:     ondansetron (Zofran) 8 mg tablet, Take by mouth., Disp: , Rfl:     sertraline (Zoloft) 100 mg tablet, Take 1 tablet (100 mg) by mouth once daily in the morning. Take before meals., Disp: , Rfl:     SUMAtriptan  "(Imitrex) 50 mg tablet, TAKE ONE TABLET BY MOUTH AT THE ONSET OF HEADACHE, CAN REPEAT IN 2 HOURS. MAX 4 TABLETS IN 1 DAYS, Disp: , Rfl:     tiZANidine (Zanaflex) 4 mg tablet, Take 1 tablet (4 mg) by mouth once daily at bedtime., Disp: 30 tablet, Rfl: 1    topiramate (Topamax) 100 mg tablet, Take 2 tablets (200 mg) by mouth once daily at bedtime., Disp: 180 tablet, Rfl: 3    Trulance tablet tablet, Take 1 tablet by mouth once daily, Disp: 90 tablet, Rfl: 0    valACYclovir (Valtrex) 500 mg tablet, TAKE 1 TABLET BY MOUTH ONCE DAILY. DURING OUTBREAKS, TAKE 1 TABLET TWICE DAILY FOR 3 DAYS, Disp: 90 tablet, Rfl: 1    zolpidem (Ambien) 10 mg tablet, Take 1 tablet (10 mg) by mouth as needed at bedtime., Disp: , Rfl:   Allergies   Allergen Reactions    Hydromorphone Itching       Objective     /77   Pulse 76   Temp 36.3 °C (97.3 °F)   Ht 1.702 m (5' 7\")   Wt 107 kg (236 lb)   BMI 36.96 kg/m²     CONSTITUTIONAL:  No acute distress    MENTAL STATUS:  Awake, alert, fully oriented to self, place, and time, with present short-term memory, good awareness of recent events, normal attention span, concentration, and fund of knowledge.    SPEECH AND LANGUAGE:  Can name and repeat, follows all commands, has no dysarthria    CRANIAL NERVES:  II-Vision present, visual fields full to confrontational testing    III/IV/VI--EOMs are present in all directions.  Pupils are symmetrically reactive in dim light.  No ptosis.    V--Normal facial sensation.    VII--No facial asymmetry.    VIII--Hearing present to voice bilaterally.    IX/X--Symmetric soft palate rise.    XI--Normal trapezius power bilaterally.    XII--Tongue protrudes without deviation.    MOTOR:  Normal power, tone, and bulk in both arms and both legs.    SENSORY:  Reduced pin sensation on the right lateral upper arm and radial forearm.  Otherwise normal pin sensation in both arms and both legs without distal-proximal gradient, asymmetry, or spinal sensory " level.    COORDINATION:  Normal finger-to-nose and heel-to-shin testing in both arms and both legs.    REFLEXES are normal and symmetric at the biceps, triceps, brachioradialis, patella, and ankle.  The plantar responses are flexor.    GAIT is normal, without steppage, ataxia, shuffling, or spasticity.      Adalberto Padgett Jr., M.D., [facility]

## 2024-05-24 ENCOUNTER — OFFICE VISIT (OUTPATIENT)
Dept: OBSTETRICS AND GYNECOLOGY | Facility: CLINIC | Age: 44
End: 2024-05-24
Payer: COMMERCIAL

## 2024-05-24 VITALS
BODY MASS INDEX: 36.76 KG/M2 | DIASTOLIC BLOOD PRESSURE: 72 MMHG | SYSTOLIC BLOOD PRESSURE: 118 MMHG | HEIGHT: 67 IN | WEIGHT: 234.2 LBS

## 2024-05-24 DIAGNOSIS — Z79.890 MENOPAUSAL SYNDROME ON HORMONE REPLACEMENT THERAPY: Primary | ICD-10-CM

## 2024-05-24 DIAGNOSIS — N76.0 RECURRENT VAGINITIS: ICD-10-CM

## 2024-05-24 DIAGNOSIS — N95.1 MENOPAUSAL SYNDROME ON HORMONE REPLACEMENT THERAPY: Primary | ICD-10-CM

## 2024-05-24 DIAGNOSIS — F52.0 HYPOACTIVE SEXUAL DESIRE DISORDER: ICD-10-CM

## 2024-05-24 PROCEDURE — 99214 OFFICE O/P EST MOD 30 MIN: CPT | Performed by: NURSE PRACTITIONER

## 2024-05-24 RX ORDER — FLUCONAZOLE 150 MG/1
TABLET ORAL
Qty: 16 TABLET | Refills: 0 | Status: SHIPPED | OUTPATIENT
Start: 2024-05-24

## 2024-05-24 RX ORDER — ESTRADIOL 0.05 MG/D
1 PATCH TRANSDERMAL
Qty: 4 PATCH | Refills: 11 | Status: SHIPPED | OUTPATIENT
Start: 2024-05-26 | End: 2025-05-26

## 2024-05-24 RX ORDER — PROGESTERONE 100 MG/1
100 CAPSULE ORAL DAILY
Qty: 30 CAPSULE | Refills: 11 | Status: SHIPPED | OUTPATIENT
Start: 2024-05-24

## 2024-05-24 ASSESSMENT — ENCOUNTER SYMPTOMS
MUSCULOSKELETAL NEGATIVE: 0
PSYCHIATRIC NEGATIVE: 0
ENDOCRINE NEGATIVE: 0
GASTROINTESTINAL NEGATIVE: 0
NEUROLOGICAL NEGATIVE: 0
HEMATOLOGIC/LYMPHATIC NEGATIVE: 0
CARDIOVASCULAR NEGATIVE: 0
CONSTITUTIONAL NEGATIVE: 0
ALLERGIC/IMMUNOLOGIC NEGATIVE: 0
EYES NEGATIVE: 0
RESPIRATORY NEGATIVE: 0

## 2024-05-24 ASSESSMENT — PAIN SCALES - GENERAL: PAINLEVEL: 0-NO PAIN

## 2024-05-24 NOTE — PROGRESS NOTES
Subjective   Patient ID: Flower Guevara is a 43 y.o. female who presents for No chief complaint on file..  HPI  H/o recurrent yeast infections, on long term suppression therapy, if she misses one dose she has a reoccurrence of symptoms  Continues to have vaginal dryness and HSDD  Addyi caused a rise in her BP    She is also having:  VMS  Difficulty sleeping  Mood changes  Brain fog  Joint pain    H/o hysterectomy for endometriosis, still has her ovaries    No h/o DVT or PE  No HTN     Review of Systems    Objective   Physical Exam    Assessment/Plan   Diagnoses and all orders for this visit:  Menopausal syndrome on hormone replacement therapy  -     estradiol (Climara) 0.05 mg/24 hr patch; Place 1 patch over 7 days on the skin 1 (one) time per week.  -     progesterone (Prometrium) 100 mg capsule; Take 1 capsule (100 mg) by mouth once daily. Take at bedtime  Hypoactive sexual desire disorder  -     Referral to Clinical Pharmacy; Future  Recurrent vaginitis  -     fluconazole (Diflucan) 150 mg tablet; Take one dose weekly for long term suppression therapy of recurrent yeast    Decision for MHT; will still prescribe progesterone despite hysterectomy d/t h/o endometriosis  Referral to Linda Henley to discuss vyleesi  Will continue with weekly suppression therapy with diflucan    Follow up in 8 weeks       WIINFRED Nye-CNP 05/24/24 2:00 PM

## 2024-06-03 PROBLEM — C44.41 BASAL CELL CARCINOMA (BCC) OF NECK: Status: ACTIVE | Noted: 2024-06-03

## 2024-06-03 PROBLEM — E66.9 OBESITY WITH BODY MASS INDEX 30 OR GREATER: Status: ACTIVE | Noted: 2024-06-03

## 2024-06-03 PROBLEM — R05.3 PERSISTENT COUGH: Status: ACTIVE | Noted: 2024-06-03

## 2024-06-03 PROBLEM — K60.2 ANAL FISSURE: Status: ACTIVE | Noted: 2018-05-03

## 2024-06-03 PROBLEM — Z86.39 HISTORY OF THYROID DISORDER: Status: ACTIVE | Noted: 2024-06-03

## 2024-06-03 PROBLEM — Z86.59 HISTORY OF MENTAL DISORDER: Status: ACTIVE | Noted: 2024-06-03

## 2024-06-03 PROBLEM — J18.9 PNEUMONIA: Status: ACTIVE | Noted: 2024-06-03

## 2024-06-03 PROBLEM — F41.9 ANXIETY: Status: ACTIVE | Noted: 2024-06-03

## 2024-06-03 PROBLEM — L98.9 SKIN LESION: Status: ACTIVE | Noted: 2024-06-03

## 2024-06-03 PROBLEM — H60.509 ACUTE OTITIS EXTERNA: Status: ACTIVE | Noted: 2024-06-03

## 2024-06-03 PROBLEM — M79.673 PAIN OF FOOT: Status: ACTIVE | Noted: 2024-06-03

## 2024-06-03 PROBLEM — Z86.2 HISTORY OF ANEMIA: Status: ACTIVE | Noted: 2024-06-03

## 2024-06-03 PROBLEM — M25.542 ARTHRALGIA OF LEFT HAND: Status: ACTIVE | Noted: 2024-06-03

## 2024-06-03 PROBLEM — J06.9 ACUTE UPPER RESPIRATORY INFECTION: Status: ACTIVE | Noted: 2024-06-03

## 2024-06-03 PROBLEM — M25.529 ELBOW PAIN: Status: ACTIVE | Noted: 2024-06-03

## 2024-06-03 PROBLEM — Z86.69 HISTORY OF MIGRAINE: Status: ACTIVE | Noted: 2024-06-03

## 2024-06-03 PROBLEM — R00.0 TACHYCARDIA: Status: ACTIVE | Noted: 2024-06-03

## 2024-06-03 RX ORDER — METRONIDAZOLE 7.5 MG/G
CREAM TOPICAL
COMMUNITY
Start: 2024-05-16

## 2024-06-05 ENCOUNTER — OFFICE VISIT (OUTPATIENT)
Dept: PRIMARY CARE | Facility: CLINIC | Age: 44
End: 2024-06-05
Payer: COMMERCIAL

## 2024-06-05 VITALS
DIASTOLIC BLOOD PRESSURE: 78 MMHG | BODY MASS INDEX: 36.73 KG/M2 | SYSTOLIC BLOOD PRESSURE: 122 MMHG | OXYGEN SATURATION: 98 % | WEIGHT: 234 LBS | HEIGHT: 67 IN | HEART RATE: 106 BPM

## 2024-06-05 DIAGNOSIS — Z12.31 ENCOUNTER FOR SCREENING MAMMOGRAM FOR MALIGNANT NEOPLASM OF BREAST: Primary | ICD-10-CM

## 2024-06-05 DIAGNOSIS — E55.9 VITAMIN D DEFICIENCY: ICD-10-CM

## 2024-06-05 DIAGNOSIS — E03.9 HYPOTHYROIDISM, UNSPECIFIED TYPE: ICD-10-CM

## 2024-06-05 DIAGNOSIS — E66.9 OBESITY (BMI 30-39.9): ICD-10-CM

## 2024-06-05 DIAGNOSIS — K58.1 IRRITABLE BOWEL SYNDROME WITH CONSTIPATION: ICD-10-CM

## 2024-06-05 DIAGNOSIS — G25.81 RESTLESS LEG SYNDROME: ICD-10-CM

## 2024-06-05 PROBLEM — Z86.69 HISTORY OF MIGRAINE: Status: RESOLVED | Noted: 2024-06-03 | Resolved: 2024-06-05

## 2024-06-05 PROBLEM — J18.9 PNEUMONIA: Status: RESOLVED | Noted: 2024-06-03 | Resolved: 2024-06-05

## 2024-06-05 PROBLEM — Z86.2 HISTORY OF ANEMIA: Status: RESOLVED | Noted: 2024-06-03 | Resolved: 2024-06-05

## 2024-06-05 PROBLEM — Z86.59 HISTORY OF MENTAL DISORDER: Status: RESOLVED | Noted: 2024-06-03 | Resolved: 2024-06-05

## 2024-06-05 PROBLEM — J06.9 ACUTE UPPER RESPIRATORY INFECTION: Status: RESOLVED | Noted: 2024-06-03 | Resolved: 2024-06-05

## 2024-06-05 PROBLEM — R05.3 PERSISTENT COUGH: Status: RESOLVED | Noted: 2024-06-03 | Resolved: 2024-06-05

## 2024-06-05 PROBLEM — H60.509 ACUTE OTITIS EXTERNA: Status: RESOLVED | Noted: 2024-06-03 | Resolved: 2024-06-05

## 2024-06-05 PROBLEM — Z86.39 HISTORY OF THYROID DISORDER: Status: RESOLVED | Noted: 2024-06-03 | Resolved: 2024-06-05

## 2024-06-05 PROCEDURE — 1036F TOBACCO NON-USER: CPT | Performed by: NURSE PRACTITIONER

## 2024-06-05 PROCEDURE — 99214 OFFICE O/P EST MOD 30 MIN: CPT | Performed by: NURSE PRACTITIONER

## 2024-06-05 ASSESSMENT — PATIENT HEALTH QUESTIONNAIRE - PHQ9
1. LITTLE INTEREST OR PLEASURE IN DOING THINGS: NOT AT ALL
SUM OF ALL RESPONSES TO PHQ9 QUESTIONS 1 AND 2: 0
2. FEELING DOWN, DEPRESSED OR HOPELESS: NOT AT ALL

## 2024-06-05 NOTE — PROGRESS NOTES
"Subjective   Patient ID: Flower Guevara is a 43 y.o. female who presents for Follow-up (Pt states restless legs, stomach issues since last week vomiting, diarrhea and stomach pains. Her father is a carrier for BRC1 and wants to know if she should get tested. Pt also needs refills and wants a mammo ).    HPI     1. RLS  Showed on a sleep study 3/12/23  She reports an incredible urge to move her legs  Has to get up and walk around and massage them  Has had to wear compression hose to bed   These things help but she is hoping to take something to prevent her from having to do this    2. GI issues  Last week, had nausea, vomiting, diarrhea, and abdominal pain  Was taking Pepto, Imodium   Still feels bloated, can't eat much, doesn't have much of an appetite  She is now having constipation as of the last two days  She was able to have a bowel movement today and it was formed, but states she had to strain   Mentions she stopped taking Trulance a couple months ago because she was having diarrhea with it, even taking once every few days   Admits her diet is not good  She was previously seeing GI but he left the system so she needs a new one    3. Fam hx of BRCA gene  States her dad has lung cancer with mets to the brain  He was found to have the BRCA gene, so she is wondering if she needs to be tested for this   She will be due for her mammogram next month       Review of Systems  ROS negative except as noted above in HPI.     Objective   /78   Pulse 106   Ht 1.702 m (5' 7\")   Wt 106 kg (234 lb)   SpO2 98%   BMI 36.65 kg/m²     Physical Exam  General: Alert and oriented, in no acute distress. Appears stated age, well-nourished, and well hydrated  HEENT:  - Head: Normocephalic and atraumatic   - Eyes: EOMI, PERRLA  - ENT: Hearing grossly intact  Heart: RRR. No murmurs, clicks, or rubs  Lungs: Unlabored breathing. CTAB with no crackles, wheezes, or rhonchi  Abdomen: Normal BS in all 4 quadrants. Soft, non-tender, " non-distended, with no masses  Extremities: Warm and well perfused. No edema. Normal peripheral pulses  Musculoskeletal: Normal gait and station  Neurological: Alert and oriented. No gross neurological deficits  Psychological: Appropriate mood and affect  Skin: No rash, abnormal lesions, cyanosis, or erythema    Assessment/Plan   1. Restless leg syndrome  - Evident on sleep study 3/12/24  - Check ferritin level, determine treatment based on result    2. IBS-C  - Had what seems to be a GI bug last week, still having some minor residual symptoms but also some constipation  - Recommend restarting Trulance  - Referral placed for GI    3. Family history of BRCA gene  - Patient unsure if she should be tested, offered referral to genetics or she is welcome to ask her GYN about this    4. Breast CA screening  - Mammogram ordered    Check labs: lipid panel, HgA1c, CMP, CBCd, TSH, vit D    RTC in 2 months for physical or sooner maday Camejo, WINIFRED-CNP  [facility]

## 2024-06-06 ENCOUNTER — CLINICAL SUPPORT (OUTPATIENT)
Dept: PRIMARY CARE | Facility: CLINIC | Age: 44
End: 2024-06-06
Payer: COMMERCIAL

## 2024-06-06 DIAGNOSIS — G25.81 RESTLESS LEG SYNDROME: ICD-10-CM

## 2024-06-06 DIAGNOSIS — E66.9 OBESITY (BMI 30-39.9): ICD-10-CM

## 2024-06-06 DIAGNOSIS — E03.9 HYPOTHYROIDISM, UNSPECIFIED TYPE: ICD-10-CM

## 2024-06-06 DIAGNOSIS — E55.9 VITAMIN D DEFICIENCY: ICD-10-CM

## 2024-06-06 LAB
25(OH)D3 SERPL-MCNC: 32 NG/ML (ref 30–100)
ALBUMIN SERPL BCP-MCNC: 4.1 G/DL (ref 3.4–5)
ALP SERPL-CCNC: 89 U/L (ref 33–110)
ALT SERPL W P-5'-P-CCNC: 27 U/L (ref 7–45)
ANION GAP SERPL CALC-SCNC: 15 MMOL/L (ref 10–20)
AST SERPL W P-5'-P-CCNC: 21 U/L (ref 9–39)
BASOPHILS # BLD AUTO: 0.08 X10*3/UL (ref 0–0.1)
BASOPHILS NFR BLD AUTO: 0.9 %
BILIRUB SERPL-MCNC: 0.4 MG/DL (ref 0–1.2)
BUN SERPL-MCNC: 8 MG/DL (ref 6–23)
CALCIUM SERPL-MCNC: 9.3 MG/DL (ref 8.6–10.6)
CHLORIDE SERPL-SCNC: 107 MMOL/L (ref 98–107)
CHOLEST SERPL-MCNC: 161 MG/DL (ref 0–199)
CHOLESTEROL/HDL RATIO: 3.5
CO2 SERPL-SCNC: 23 MMOL/L (ref 21–32)
CREAT SERPL-MCNC: 0.92 MG/DL (ref 0.5–1.05)
EGFRCR SERPLBLD CKD-EPI 2021: 79 ML/MIN/1.73M*2
EOSINOPHIL # BLD AUTO: 0.37 X10*3/UL (ref 0–0.7)
EOSINOPHIL NFR BLD AUTO: 4.2 %
ERYTHROCYTE [DISTWIDTH] IN BLOOD BY AUTOMATED COUNT: 12.6 % (ref 11.5–14.5)
EST. AVERAGE GLUCOSE BLD GHB EST-MCNC: 97 MG/DL
FERRITIN SERPL-MCNC: 103 NG/ML (ref 8–150)
GLUCOSE SERPL-MCNC: 84 MG/DL (ref 74–99)
HBA1C MFR BLD: 5 %
HCT VFR BLD AUTO: 46.1 % (ref 36–46)
HDLC SERPL-MCNC: 45.5 MG/DL
HGB BLD-MCNC: 14.4 G/DL (ref 12–16)
IMM GRANULOCYTES # BLD AUTO: 0.04 X10*3/UL (ref 0–0.7)
IMM GRANULOCYTES NFR BLD AUTO: 0.5 % (ref 0–0.9)
LDLC SERPL CALC-MCNC: 65 MG/DL
LYMPHOCYTES # BLD AUTO: 2.96 X10*3/UL (ref 1.2–4.8)
LYMPHOCYTES NFR BLD AUTO: 33.8 %
MCH RBC QN AUTO: 32.1 PG (ref 26–34)
MCHC RBC AUTO-ENTMCNC: 31.2 G/DL (ref 32–36)
MCV RBC AUTO: 103 FL (ref 80–100)
MONOCYTES # BLD AUTO: 0.58 X10*3/UL (ref 0.1–1)
MONOCYTES NFR BLD AUTO: 6.6 %
NEUTROPHILS # BLD AUTO: 4.72 X10*3/UL (ref 1.2–7.7)
NEUTROPHILS NFR BLD AUTO: 54 %
NON HDL CHOLESTEROL: 116 MG/DL (ref 0–149)
NRBC BLD-RTO: 0 /100 WBCS (ref 0–0)
PLATELET # BLD AUTO: 339 X10*3/UL (ref 150–450)
POTASSIUM SERPL-SCNC: 3.8 MMOL/L (ref 3.5–5.3)
PROT SERPL-MCNC: 6.8 G/DL (ref 6.4–8.2)
RBC # BLD AUTO: 4.48 X10*6/UL (ref 4–5.2)
SODIUM SERPL-SCNC: 141 MMOL/L (ref 136–145)
T4 FREE SERPL-MCNC: 1.03 NG/DL (ref 0.78–1.48)
TRIGL SERPL-MCNC: 255 MG/DL (ref 0–149)
TSH SERPL-ACNC: 4.14 MIU/L (ref 0.44–3.98)
VLDL: 51 MG/DL (ref 0–40)
WBC # BLD AUTO: 8.8 X10*3/UL (ref 4.4–11.3)

## 2024-06-06 PROCEDURE — 82306 VITAMIN D 25 HYDROXY: CPT

## 2024-06-06 PROCEDURE — 80061 LIPID PANEL: CPT

## 2024-06-06 PROCEDURE — 84443 ASSAY THYROID STIM HORMONE: CPT

## 2024-06-06 PROCEDURE — 83036 HEMOGLOBIN GLYCOSYLATED A1C: CPT

## 2024-06-06 PROCEDURE — 84439 ASSAY OF FREE THYROXINE: CPT

## 2024-06-06 PROCEDURE — 80053 COMPREHEN METABOLIC PANEL: CPT

## 2024-06-06 PROCEDURE — 85025 COMPLETE CBC W/AUTO DIFF WBC: CPT

## 2024-06-06 PROCEDURE — 82728 ASSAY OF FERRITIN: CPT

## 2024-06-06 PROCEDURE — 36415 COLL VENOUS BLD VENIPUNCTURE: CPT

## 2024-06-11 DIAGNOSIS — E03.9 HYPOTHYROIDISM, UNSPECIFIED TYPE: ICD-10-CM

## 2024-06-11 DIAGNOSIS — G25.81 RESTLESS LEG SYNDROME: ICD-10-CM

## 2024-06-11 DIAGNOSIS — F41.1 GENERALIZED ANXIETY DISORDER: Primary | ICD-10-CM

## 2024-06-11 RX ORDER — LEVOTHYROXINE SODIUM 75 UG/1
75 TABLET ORAL DAILY
Qty: 90 TABLET | Refills: 0 | Status: SHIPPED | OUTPATIENT
Start: 2024-06-11

## 2024-06-11 RX ORDER — ROPINIROLE 0.25 MG/1
TABLET, FILM COATED ORAL
Qty: 30 TABLET | Refills: 0 | Status: SHIPPED | OUTPATIENT
Start: 2024-06-11

## 2024-06-17 DIAGNOSIS — M54.2 CERVICALGIA: ICD-10-CM

## 2024-06-18 RX ORDER — TIZANIDINE 4 MG/1
4 TABLET ORAL NIGHTLY
Qty: 30 TABLET | Refills: 5 | Status: SHIPPED | OUTPATIENT
Start: 2024-06-18

## 2024-06-18 NOTE — PROGRESS NOTES
"  Patient ID: Flower Guevara is a 43 y.o. female who presents for No chief complaint on file..    Referring Provider: William Perez  Pt was referred for vyleesi      Preferred Pharmacy:    [id number] [address] AT CORNER OF [address] [address]  Phone: [phone] Fax: [phone]    [facility][id number] - [place], [place] - [address] [facility] [address] [facility] [address] [facility]  [address] [facility] [address]  Phone: [phone] Fax: [phone]      Subjective      LMP: 2012   Uterus: 2012, hysterectomy, ovaries remain      Any Contraindications and/or Cautions to HT:   PH/FH breast cancer: No  Estrogen sensitive? (Father tested positive for BRACA1)  PH/FH of ovarian cancer: Maternal grandmother  PH/FH of uterine cancer: No  PH/FH of colon cancer: No  PH/FH of pancreatic cancer: No  PH/FH Dementia: No  Stroke, MI, VTE, clotting disorder, or congenital heart disease: No  Systemic lupus erythematosus (increased clot risk): No  H/o Gallbladder disease? No  Tobacco? No  Alcohol? Very rarely, causes migraines, 1-2 drinks/month      GI issues? Yes, Trulance for IBS- variable between constipation and diarrhea. Hasn't been taking lately, working too well and having accidents. When not taking the medication every few days has BM.     Hx Migraine? Yes, current.       Vaginal Symptoms  Dyspareunia (pain before/during/after intercourse): Only occasionally, had endometriosis found during hysterectomy  Vaginal Bleeding: No  Vaginal Dryness: Yes, \"extreme\"  Dysuria (pain, burning, stinging, or itching with urination): No  Vaginal and/or vulvar irritation/itching: No  Recurrent urinary tract infections: No  Recurrent yeast infections: Yes, ongoing treatment  Recurrent BV: No  No results found for: \"ESTRADIOLFRE\", \"PROGESTERONE\", \"ESTRADIOL\", \"FSH\"    Non Pharm Mgmt:   Lubricants - has tried many, hasn't found anything she likes  Previous Treatment: " "  None  Current Treatment:   Estradiol 0.05mg  Progesterone 100mg daily at night    Vasomotor symptoms   Frequency:  Yes, several times a week day and night. Just started the estradiol patch about 1-2 weeks ago, so has not seen notable improvement yet.       Lab Results   Component Value Date    AST 21 06/06/2024    AST 20 10/16/2023    AST 18 11/15/2022    ALT 27 06/06/2024    ALT 19 10/16/2023    ALT 21 11/15/2022       Libido  Patient has been experiencing reduced libido x 2 years.   No relationship issues  Medications may be contributing to reduced libido  Patient has tried addyi - stopped after 3 days, because it made her blood pressure increase. She felt she was \"racing\" and her whole body was not reacting well.   Has tried horny goat weed x 2 months from [employer]. Did not work.     No results found for: \"TESTOSTERONE\"    Urinary Incontinence  Past symptoms with coughing or sneezing, but hasn't had any troubles lately.     Bone Health  Osteopenia or osteoporosis: None    Cardiovascular Health  The 10-year ASCVD risk score (Marcia MCNEAL, et al., 2019) is: 0.6%    Values used to calculate the score:      Age: 43 years      Sex: Female      Is Non- : No      Diabetic: No      Tobacco smoker: No      Systolic Blood Pressure: 122 mmHg      Is BP treated: No      HDL Cholesterol: 45.5 mg/dL      Total Cholesterol: 161 mg/dL    Lab Results   Component Value Date    CHOL 161 06/06/2024     Lab Results   Component Value Date    HDL 45.5 06/06/2024     Lab Results   Component Value Date    LDLCALC 65 06/06/2024     Lab Results   Component Value Date    TRIG 255 (H) 06/06/2024     No components found for: \"CHOLHDL\"   BP Readings from Last 3 Encounters:   06/05/24 122/78   05/24/24 118/72   05/23/24 126/77        Blood Sugar Balance  Lab Results   Component Value Date    GLUCOSE 84 06/06/2024    HGBA1C 5.0 06/06/2024    HGBA1C 4.9 11/15/2022    HGBA1C 5.0 04/12/2021     No results found for: \"LEPTIN\", " "\"INSULFAST\", \"GLUF\"      Thyroid  Lab Results   Component Value Date    TSH 4.14 (H) 06/06/2024    FREET4 1.03 06/06/2024         Iron Status  Lab Results   Component Value Date    IRON 113 10/13/2021    TIBC 381 10/13/2021    FERRITIN 103 06/06/2024        Kidney Function  Lab Results   Component Value Date    GFRF 79 11/15/2022    CREATININE 0.92 06/06/2024       Potassium  Lab Results   Component Value Date    K 3.8 06/06/2024        Vitamin D3  Lab Results   Component Value Date    VITD25 32 06/06/2024       Current Outpatient Medications on File Prior to Visit   Medication Sig Dispense Refill    acetaminophen (Tylenol) 325 mg tablet Take by mouth every 6 hours.      aspirin-acetaminophen-caffeine (Excedrin Extra Strength) 250-250-65 mg tablet Take by mouth every 6 hours if needed.      brimonidine 0.33 % gel with pump Apply a pea-sized amount to face once daily      cholecalciferol (Vitamin D-3) 50 mcg (2,000 unit) capsule Take 1 capsule (50 mcg) by mouth once daily.      clindamycin (Cleocin T) 1 % lotion Apply topically 2 times a day. 60 mL 11    clonazePAM (KlonoPIN) 0.5 mg tablet Take 1 tablet (0.5 mg) by mouth 3 times a day as needed.      estradiol (Climara) 0.05 mg/24 hr patch Place 1 patch over 7 days on the skin 1 (one) time per week. 4 patch 11    fluconazole (Diflucan) 150 mg tablet Take one dose weekly for long term suppression therapy of recurrent yeast 16 tablet 0    L. acidophilus/Bifid. animalis 32 billion cell capsule Take by mouth.      lamoTRIgine (LaMICtal) 150 mg tablet TAKE 2 TABLETS BY MOUTH AT BEDTIME. TOTAL DOSE 300MG      levothyroxine (Synthroid, Levoxyl) 75 mcg tablet Take 1 tablet (75 mcg) by mouth once daily. 90 tablet 0    lurasidone (Latuda) 40 mg tablet TAKE 1 TABLET BY MOUTH IN THE EVENING WITH FOOD      melatonin 10 mg capsule Take by mouth.      metroNIDAZOLE (Metrocream) 0.75 % cream APPLY CREAM EXTERNALLY TO AFFECTED AREA OF FACE TWICE DAILY      ondansetron (Zofran) 8 mg " tablet Take by mouth.      progesterone (Prometrium) 100 mg capsule Take 1 capsule (100 mg) by mouth once daily. Take at bedtime 30 capsule 11    rOPINIRole (Requip) 0.25 mg tablet Take 1 tablet once daily 1-3 hours before bedtime for 2 days, then 2 tablets once daily after that 30 tablet 0    sertraline (Zoloft) 100 mg tablet Take 1 tablet (100 mg) by mouth once daily in the morning. Take before meals.      SUMAtriptan (Imitrex) 50 mg tablet TAKE ONE TABLET BY MOUTH AT THE ONSET OF HEADACHE, CAN REPEAT IN 2 HOURS. MAX 4 TABLETS IN 1 DAYS      tiZANidine (Zanaflex) 4 mg tablet TAKE 1 TABLET (4 MG) BY MOUTH ONCE DAILY AT BEDTIME. 30 tablet 5    topiramate (Topamax) 100 mg tablet Take 2 tablets (200 mg) by mouth once daily at bedtime. 180 tablet 3    Trulance tablet tablet Take 1 tablet by mouth once daily 90 tablet 0    valACYclovir (Valtrex) 500 mg tablet TAKE 1 TABLET BY MOUTH ONCE DAILY. DURING OUTBREAKS, TAKE 1 TABLET TWICE DAILY FOR 3 DAYS 90 tablet 1    zolpidem (Ambien) 10 mg tablet Take 1 tablet (10 mg) by mouth as needed at bedtime.      [DISCONTINUED] tiZANidine (Zanaflex) 4 mg tablet Take 1 tablet (4 mg) by mouth once daily at bedtime. 30 tablet 1     No current facility-administered medications on file prior to visit.        Medication and allergy reconciliation completed     Drug Interactions   Estradiol:Tizanidine: Concurrent use of tizanidine and estrogen containing hormonal contraceptives or hormone replacement therapy may result in elevated levels of and effects from tizanidine, including hypotension, bradycardia, drowsiness, sedation, and decreased psychomotor function.   Tizanidine is only taken as needed at bedtime, not even once weekly. Does not take on same night as zolpidem.   Concurrent use of lamotrigine and estrogens may result in decreased levels and effectiveness of both agents. Increased seizure rates have been reported in patients taking lamotrigine for epilepsy.   Patient discussed with  William and plans to talk with her psychiatrist about this, she is not using lamotrigine for seizures.     Assessment/Plan     Patient is experiencing reduced libido due to HSDD. She has been on estradiol patch 0.05mg along with progesterone for 1-2 weeks, and has tried/failed Addyi due to it it causing an elevation in her blood pressure.   William Perez would like for her to try Vyleesi.   Provided patient education on administration.   Patient understands not to take more than 1 dose in 24 hours or 8 doses per month.   Discussed the risks of hyperpigmentation.   Reviewed side effects and cautions/warnings  Patients blood pressure <120/80, WNL  No kidney, liver, or cardiovascular problems.       Follow-up: 7/31/24 2:30pm     Time spent with pt: Total length of time 35 (minutes) of the encounter and more than 50% was spent counseling the patient.    Copay assistance assessment:   Do you have copays on your medications? Yes  Do you have trouble affording your current medications? N/A     Patient Assistance Screening (VAF)    Patient verbally reports monthly or yearly income which is less than 400% federal poverty level   Application for program has been submitted for the following medications:  Not applicable, Vylessi is limited distribution.   Patient has been informed that program team will be reaching out to them to discuss necessary documentation, instructed to answer phone/return voicemail.   Patient aware this process may take up to 6 weeks.   If approved medication must be filled through [facility] pharmacy and may be picked up or mailed to patient.     Linda Henley, Pharm.D, [facility], [facility]  Clinical Pharmacist  Pharmacy Services  [phone]    Continue all meds under the continuation of care with the referring provider and clinical pharmacy team.    Verbal consent to manage patient's drug therapy was obtained from the patient and/or an individual authorized to act on behalf of a patient. They were informed they  may decline to participate or withdraw from participation in pharmacy services at any time.

## 2024-06-19 ENCOUNTER — APPOINTMENT (OUTPATIENT)
Dept: PHARMACY | Facility: HOSPITAL | Age: 44
End: 2024-06-19
Payer: COMMERCIAL

## 2024-06-19 DIAGNOSIS — F52.0 HYPOACTIVE SEXUAL DESIRE DISORDER: ICD-10-CM

## 2024-06-19 DIAGNOSIS — G25.81 RESTLESS LEG SYNDROME: ICD-10-CM

## 2024-06-19 RX ORDER — BREMELANOTIDE 1.75 MG/.3ML
1.75 INJECTION SUBCUTANEOUS AS NEEDED
Qty: 8 EACH | Refills: 3 | Status: SHIPPED | OUTPATIENT
Start: 2024-06-19

## 2024-06-19 RX ORDER — ROPINIROLE 0.25 MG/1
TABLET, FILM COATED ORAL
Qty: 180 TABLET | Refills: 0 | Status: SHIPPED | OUTPATIENT
Start: 2024-06-19 | End: 2024-06-21 | Stop reason: SINTOL

## 2024-06-19 NOTE — Clinical Note
Andrés Thomas, unfortunately the [facility] pharmacy can't fill Vylessi so I went ahead and sent this rx to the [facility] pharmacy. :) We went over all the info on it as well.

## 2024-06-20 ENCOUNTER — TELEPHONE (OUTPATIENT)
Dept: OBSTETRICS AND GYNECOLOGY | Facility: CLINIC | Age: 44
End: 2024-06-20
Payer: COMMERCIAL

## 2024-06-21 DIAGNOSIS — N95.1 HOT FLASHES DUE TO MENOPAUSE: Primary | ICD-10-CM

## 2024-06-21 DIAGNOSIS — G25.81 RESTLESS LEG SYNDROME: Primary | ICD-10-CM

## 2024-06-21 RX ORDER — PRAMIPEXOLE DIHYDROCHLORIDE 0.12 MG/1
TABLET ORAL
Qty: 30 TABLET | Refills: 0 | Status: SHIPPED | OUTPATIENT
Start: 2024-06-21

## 2024-06-21 RX ORDER — ESTRADIOL 0.05 MG/D
1 FILM, EXTENDED RELEASE TRANSDERMAL 2 TIMES WEEKLY
Qty: 8 PATCH | Refills: 11 | Status: SHIPPED | OUTPATIENT
Start: 2024-06-24 | End: 2025-06-24

## 2024-06-21 NOTE — PROGRESS NOTES
Climara patch not adhering, will trial the twice weekly patch; otherwise will consider gel, spray or  vaginal ring

## 2024-07-03 DIAGNOSIS — N95.1 HOT FLASHES DUE TO MENOPAUSE: Primary | ICD-10-CM

## 2024-07-08 ENCOUNTER — HOSPITAL ENCOUNTER (OUTPATIENT)
Dept: RADIOLOGY | Facility: CLINIC | Age: 44
Discharge: HOME | End: 2024-07-08
Payer: COMMERCIAL

## 2024-07-08 VITALS — BODY MASS INDEX: 36.68 KG/M2 | HEIGHT: 67 IN | WEIGHT: 233.69 LBS

## 2024-07-08 DIAGNOSIS — Z12.31 ENCOUNTER FOR SCREENING MAMMOGRAM FOR MALIGNANT NEOPLASM OF BREAST: ICD-10-CM

## 2024-07-08 PROCEDURE — 77067 SCR MAMMO BI INCL CAD: CPT | Performed by: RADIOLOGY

## 2024-07-08 PROCEDURE — 77063 BREAST TOMOSYNTHESIS BI: CPT | Performed by: RADIOLOGY

## 2024-07-08 PROCEDURE — 77067 SCR MAMMO BI INCL CAD: CPT

## 2024-07-09 ENCOUNTER — APPOINTMENT (OUTPATIENT)
Dept: OBSTETRICS AND GYNECOLOGY | Facility: CLINIC | Age: 44
End: 2024-07-09
Payer: COMMERCIAL

## 2024-07-09 DIAGNOSIS — N95.2 VAGINAL ATROPHY: ICD-10-CM

## 2024-07-09 DIAGNOSIS — Z79.890 MENOPAUSAL SYNDROME ON HORMONE REPLACEMENT THERAPY: Primary | ICD-10-CM

## 2024-07-09 DIAGNOSIS — N95.1 MENOPAUSAL SYNDROME ON HORMONE REPLACEMENT THERAPY: Primary | ICD-10-CM

## 2024-07-09 PROCEDURE — 99212 OFFICE O/P EST SF 10 MIN: CPT | Performed by: NURSE PRACTITIONER

## 2024-07-09 RX ORDER — ESTRADIOL 2 MG/1
2 SYSTEM VAGINAL
Qty: 1 EACH | Refills: 3 | Status: SHIPPED | OUTPATIENT
Start: 2024-07-09 | End: 2025-07-09

## 2024-07-09 NOTE — PROGRESS NOTES
Subjective   Patient ID: Flower Guevara is a 43 y.o. female who presents for No chief complaint on file..  HPI  Pt last seen by me 5/2024  VMS  Difficulty sleeping  Mood changes  Brain fog  Joint pain  Vaginal dryness  HSDD     H/o hysterectomy for endometriosis, still has her ovaries  AE to [facility]    I prescribed her 0.05mg estradiol patch and 100mg po progesterone d/t h/o endometriosis  I referred her to [facility] to help with prescription coverage of Vyleesi    She contacted me that estradiol patch is not adhering to her skin, I changed her prescription from weekly to twice weekly to see if that helped    Today she states:  The pharmacist told her it was going to interact with her other medications  Developed CD from a patch  Currently not on any MHT    Her 2 most bothersome symptoms are: HSDD and GSM including difficulty reaching orgasm  Previously tried estradiol cream but found it too messy  Tried Replens  and other OTC lubricants but have not been helpful   Working with her psychiatrist to wean off sertraline which she also knows will be helpful for HSDD and orgasm      Review of Systems    Objective   Physical Exam    Assessment/Plan   There are no diagnoses linked to this encounter.  Vyleesi was cost prohibitive and Addyi AE  Decision to check baseline testosterone levels and if normal I will prescribe testosterone  Estring for GSM    I will contact her when  see the results of her labs           DELIA Nye 07/09/24 3:58 PM

## 2024-07-14 DIAGNOSIS — G25.81 RESTLESS LEG SYNDROME: ICD-10-CM

## 2024-07-15 RX ORDER — PRAMIPEXOLE DIHYDROCHLORIDE 0.12 MG/1
TABLET ORAL
Qty: 90 TABLET | Refills: 1 | Status: SHIPPED | OUTPATIENT
Start: 2024-07-15

## 2024-07-18 ENCOUNTER — CLINICAL SUPPORT (OUTPATIENT)
Dept: PRIMARY CARE | Facility: CLINIC | Age: 44
End: 2024-07-18
Payer: COMMERCIAL

## 2024-07-18 DIAGNOSIS — E03.9 HYPOTHYROIDISM, UNSPECIFIED TYPE: Primary | ICD-10-CM

## 2024-07-18 DIAGNOSIS — E03.9 HYPOTHYROIDISM, UNSPECIFIED TYPE: ICD-10-CM

## 2024-07-18 LAB — TSH SERPL-ACNC: 2.02 MIU/L (ref 0.44–3.98)

## 2024-07-18 PROCEDURE — 36415 COLL VENOUS BLD VENIPUNCTURE: CPT

## 2024-07-18 PROCEDURE — 84443 ASSAY THYROID STIM HORMONE: CPT

## 2024-07-19 ENCOUNTER — APPOINTMENT (OUTPATIENT)
Dept: PRIMARY CARE | Facility: CLINIC | Age: 44
End: 2024-07-19
Payer: COMMERCIAL

## 2024-07-19 ENCOUNTER — CLINICAL SUPPORT (OUTPATIENT)
Dept: PRIMARY CARE | Facility: CLINIC | Age: 44
End: 2024-07-19
Payer: COMMERCIAL

## 2024-07-19 ENCOUNTER — TELEPHONE (OUTPATIENT)
Dept: PRIMARY CARE | Facility: CLINIC | Age: 44
End: 2024-07-19

## 2024-07-19 DIAGNOSIS — N95.1 MENOPAUSAL SYNDROME ON HORMONE REPLACEMENT THERAPY: ICD-10-CM

## 2024-07-19 DIAGNOSIS — Z79.890 MENOPAUSAL SYNDROME ON HORMONE REPLACEMENT THERAPY: ICD-10-CM

## 2024-07-19 PROCEDURE — 36415 COLL VENOUS BLD VENIPUNCTURE: CPT

## 2024-07-19 PROCEDURE — 84402 ASSAY OF FREE TESTOSTERONE: CPT

## 2024-07-24 LAB
TESTOSTERONE FREE (CHAN): 5.7 PG/ML (ref 0.1–6.4)
TESTOSTERONE,TOTAL,LC-MS/MS: 55 NG/DL (ref 2–45)

## 2024-07-31 ENCOUNTER — APPOINTMENT (OUTPATIENT)
Dept: PHARMACY | Facility: HOSPITAL | Age: 44
End: 2024-07-31
Payer: COMMERCIAL

## 2024-08-09 ENCOUNTER — APPOINTMENT (OUTPATIENT)
Dept: GASTROENTEROLOGY | Facility: CLINIC | Age: 44
End: 2024-08-09
Payer: COMMERCIAL

## 2024-08-14 ENCOUNTER — APPOINTMENT (OUTPATIENT)
Dept: PRIMARY CARE | Facility: CLINIC | Age: 44
End: 2024-08-14
Payer: COMMERCIAL

## 2024-08-14 VITALS
HEIGHT: 67 IN | HEART RATE: 74 BPM | SYSTOLIC BLOOD PRESSURE: 120 MMHG | DIASTOLIC BLOOD PRESSURE: 85 MMHG | WEIGHT: 236 LBS | BODY MASS INDEX: 37.04 KG/M2

## 2024-08-14 DIAGNOSIS — G43.809 OTHER MIGRAINE WITHOUT STATUS MIGRAINOSUS, NOT INTRACTABLE: ICD-10-CM

## 2024-08-14 DIAGNOSIS — E03.9 HYPOTHYROIDISM, UNSPECIFIED TYPE: ICD-10-CM

## 2024-08-14 DIAGNOSIS — Z12.11 SCREENING FOR COLON CANCER: ICD-10-CM

## 2024-08-14 DIAGNOSIS — Z00.00 ANNUAL PHYSICAL EXAM: Primary | ICD-10-CM

## 2024-08-14 PROBLEM — B35.6 TINEA CRURIS: Status: ACTIVE | Noted: 2024-08-14

## 2024-08-14 PROCEDURE — 3008F BODY MASS INDEX DOCD: CPT | Performed by: NURSE PRACTITIONER

## 2024-08-14 PROCEDURE — 99396 PREV VISIT EST AGE 40-64: CPT | Performed by: NURSE PRACTITIONER

## 2024-08-14 PROCEDURE — 1036F TOBACCO NON-USER: CPT | Performed by: NURSE PRACTITIONER

## 2024-08-14 RX ORDER — IBUPROFEN 800 MG/1
800 TABLET ORAL EVERY 8 HOURS PRN
COMMUNITY

## 2024-08-14 RX ORDER — LEVOTHYROXINE SODIUM 75 UG/1
75 TABLET ORAL DAILY
Qty: 90 TABLET | Refills: 1 | Status: SHIPPED | OUTPATIENT
Start: 2024-08-14

## 2024-08-14 RX ORDER — IBUPROFEN 600 MG/1
TABLET ORAL
COMMUNITY
End: 2024-08-14 | Stop reason: ALTCHOICE

## 2024-08-14 RX ORDER — SERTRALINE HYDROCHLORIDE 50 MG/1
25 TABLET, FILM COATED ORAL
COMMUNITY
Start: 2024-07-23

## 2024-08-14 RX ORDER — ONDANSETRON 8 MG/1
8 TABLET, ORALLY DISINTEGRATING ORAL EVERY 8 HOURS PRN
Qty: 30 TABLET | Refills: 0 | Status: SHIPPED | OUTPATIENT
Start: 2024-08-14

## 2024-08-14 RX ORDER — ACETAMINOPHEN 500 MG
TABLET ORAL
COMMUNITY
Start: 2024-08-01 | End: 2024-08-14 | Stop reason: ALTCHOICE

## 2024-08-14 ASSESSMENT — ENCOUNTER SYMPTOMS
OCCASIONAL FEELINGS OF UNSTEADINESS: 0
DEPRESSION: 0
LOSS OF SENSATION IN FEET: 0

## 2024-08-14 NOTE — PROGRESS NOTES
Flower Guevara is a 44 y.o. female who is presenting for annual physical exam.     Last  Visit:   Reported Health: Fair     Dental, Vision, Hearing:  Regular dental visits: yes  - Brushes teeth 1 times per day  - Flosses? yes, once per week  Vision problems: yes  - Wears glasses or contacts? yes, both   - Last eye exam:    Hearing loss: no    Immunization status:  Up to date: no, due for another COVID    Lifestyle:  Healthy diet: no  Regular exercise: no  Alcohol: no  Tobacco: no  Drugs: no    Reproductive Health:  S/p hysterectomy  Sexually active: yes    Pregnancy History:   : 3  Parity: 3  - Full term: 3  - Premature:  - (s):  - Living: 3  - AB Induced:  - AB Spont:  - Ectopic:   - Multiple births:    Cervical Cancer Screening:  Last pap:  21  Breast Cancer Screening:  Last mammogram:  24  Colorectal Cancer Screening:  Last colonoscopy or Cologuard:  none  Metabolic Screening:  Lipid profile: 24  Glucose screen: 24    Review of Systems  Gen: denies fever, chills, weight loss, fatigue  HEENT: denies sinus pressure, sinus congestion, runny nose, red eyes, itchy eyes, vision loss, ear pain, hearing loss, throat pain, trouble swallowing  Neck: chronic neck pain. denies neck swelling or masses  Chest/breast: denies breast pain, breast lumps, nipple discharge  CV: denies chest pain, palpitations, fast heart rate, syncope  Resp: denies shortness of breath, cough, wheezing  GI: denies abdominal pain, nausea, diarrhea, constipation, hematochezia, melena  : denies dysuria, hematuria, vaginal discharge, frequency  Endo: denies polydipsia, polyuria, heat/cold intolerance, weight change, hair thinning  Heme: denies easy bruising, easy bleeding  Neuro: chronic headaches/migraines. denies numbness, tingling, memory loss, changes in vision  MSK:  denies joint pain, joint swelling, weakness  Psych: denies suicidal ideation, homicidal ideation, depression, anxiety, mood  swings  Skin: denies rashes, abnormal lesions, itching, changes in moles     Previous History  Past Medical History:   Diagnosis Date    Borderline personality disorder (Multi) 07/13/2020    Borderline personality disorder    Depression, unspecified 11/15/2022    Depression, unspecified depression type    Drug overdose, intentional (Multi) 02/02/2014    Hyperparathyroidism, unspecified (Multi) 11/06/2019    Hyperparathyroidism    Insomnia, unspecified 11/06/2019    Insomnia, unspecified type    Other specified personal risk factors, not elsewhere classified 11/06/2019    History of drug overdose    Personal history of diseases of the blood and blood-forming organs and certain disorders involving the immune mechanism     History of anemia    Personal history of other diseases of the digestive system 11/06/2019    History of irritable bowel syndrome    Personal history of other diseases of the digestive system 11/06/2019    History of hemorrhoids    Personal history of other diseases of the digestive system 11/06/2019    History of fatty infiltration of liver    Personal history of other diseases of the nervous system and sense organs 11/06/2019    History of restless legs syndrome    Personal history of other diseases of the nervous system and sense organs 11/06/2019    History of migraine    Personal history of other endocrine, nutritional and metabolic disease 11/06/2019    History of thyroid disease    Personal history of other infectious and parasitic diseases 11/07/2019    History of Helicobacter pylori infection    Personal history of other mental and behavioral disorders 11/06/2019    History of anxiety    Personal history of other mental and behavioral disorders 11/06/2019    History of suicidal ideation    Personal history of other mental and behavioral disorders     History of mental disorder    Personal history of other specified conditions     History of tachycardia    Personal history of other specified  conditions 11/06/2019    History of headache    Personal history of other specified conditions     History of snoring    Suicide attempt (Multi) 02/02/2014    Formatting of this note might be different from the original.   Suspected reason for overdosing    Suicide attempt (Multi) 02/02/2014    Formatting of this note might be different from the original.   Suspected reason for overdosing     Past Surgical History:   Procedure Laterality Date    OTHER SURGICAL HISTORY  06/18/2019    Hysterectomy    OTHER SURGICAL HISTORY  01/12/2021    Hemorrhoidectomy    OTHER SURGICAL HISTORY  05/15/2020    Tonsillectomy    OTHER SURGICAL HISTORY  01/24/2020    Endoscopy     Social History     Tobacco Use    Smoking status: Never    Smokeless tobacco: Never   Substance Use Topics    Alcohol use: Not Currently    Drug use: Never     Family History   Problem Relation Name Age of Onset    Anxiety disorder Mother      Depression Mother      Other (kidney stones) Mother      BRCA1 Positive Father      Anxiety disorder Father      Depression Father      Hypertension Father      Schizophrenia Other       Allergies   Allergen Reactions    Hydromorphone Itching     Current Outpatient Medications   Medication Instructions    acetaminophen (Tylenol) 325 mg tablet oral, Every 6 hours    aspirin-acetaminophen-caffeine (Excedrin Extra Strength) 250-250-65 mg tablet oral, Every 6 hours PRN    cholecalciferol (Vitamin D-3) 50 mcg (2,000 unit) capsule 1 capsule, oral, Daily    clindamycin (Cleocin T) 1 % lotion Topical, 2 times daily    clonazePAM (KLONOPIN) 0.5 mg, oral, 3 times daily PRN    estradiol (Vivelle-DOT) 0.05 mg/24 hr patch 1 patch, transdermal, 2 times weekly    Estring 2 mg, vaginal, Every 3 months, follow package directions    fluconazole (Diflucan) 150 mg tablet Take one dose weekly for long term suppression therapy of recurrent yeast    L. acidophilus/Bifid. animalis 32 billion cell capsule oral    lamoTRIgine (LaMICtal) 150 mg  tablet TAKE 2 TABLETS BY MOUTH AT BEDTIME. TOTAL DOSE 300MG    levothyroxine (SYNTHROID, LEVOXYL) 75 mcg, oral, Daily    lurasidone (Latuda) 40 mg tablet TAKE 1 TABLET BY MOUTH IN THE EVENING WITH FOOD    melatonin 10 mg capsule oral    multivit-mineral-iron-lutein tablet 1 tablet, oral, Daily    ondansetron (Zofran) 8 mg tablet oral    pramipexole (Mirapex) 0.125 mg tablet TAKE 1 TABLET ONCE DAILY 2-3 HOURS BEFORE BEDTIME    progesterone (PROMETRIUM) 100 mg, oral, Daily, Take at bedtime    sertraline (ZOLOFT) 100 mg, oral, Daily before breakfast    SUMAtriptan (Imitrex) 50 mg tablet TAKE ONE TABLET BY MOUTH AT THE ONSET OF HEADACHE, CAN REPEAT IN 2 HOURS. MAX 4 TABLETS IN 1 DAYS    tiZANidine (ZANAFLEX) 4 mg, oral, Nightly    topiramate (TOPAMAX) 200 mg, oral, Nightly    Trulance tablet tablet oral, Daily    valACYclovir (Valtrex) 500 mg tablet TAKE 1 TABLET BY MOUTH ONCE DAILY. DURING OUTBREAKS, TAKE 1 TABLET TWICE DAILY FOR 3 DAYS    Vyleesi 1.75 mg, subcutaneous, As needed    zolpidem (AMBIEN) 10 mg, oral, Nightly PRN       Physical Exam  General: Alert and oriented, in no acute distress. Appears stated age, well-nourished, and well hydrated  HEENT:  - Head: Normocephalic and atraumatic   - Eyes: EOMI, PERRLA  - ENT: Hearing grossly intact. Mucus membranes pink and moist without lesions. Tonsils present without swelling or exudates. Good dentition. TMs gray  Neck: Supple. No stiffness. No thyromegaly or thyroid nodules  Heart: RRR. No murmurs, clicks, or rubs  Lungs: Unlabored breathing. CTAB with no crackles, wheezes, or rhonchi  Abdomen: Normal BS in all 4 quadrants. Soft, non-tender, non-distended, with no masses  Extremities: Warm and well perfused. No edema. Normal peripheral pulses  Musculoskeletal: ROM intact. Strength 5/5 in BUE and BLE. No joint swelling. Normal gait and station  Neurological: Alert and oriented. No gross neurological deficits. Normal sensation. No weakness. DTRs +2/4   Psychological:  Appropriate mood and affect  Skin: No rash, abnormal lesions, cyanosis, or erythema      Assessment and Plan     1. Annual Physical  - Due for COVID vaccine, otherwise up to date on vaccines  - Mammogram up to date; due July 2025  - Colonoscopy ordered  - Pap up to date; not needed any longer as she is s/p total hysterectomy   - Declined dietician referral for obesity  - Maintain healthy lifestyle    RTC 6 months for chronic care or sooner WINIFRED Dixon-CNP  Formerly [facility]

## 2024-09-29 DIAGNOSIS — N76.0 RECURRENT VAGINITIS: ICD-10-CM

## 2024-09-29 DIAGNOSIS — B00.9 HSV-2 (HERPES SIMPLEX VIRUS 2) INFECTION: ICD-10-CM

## 2024-09-30 RX ORDER — VALACYCLOVIR HYDROCHLORIDE 500 MG/1
TABLET, FILM COATED ORAL
Qty: 90 TABLET | Refills: 1 | Status: SHIPPED | OUTPATIENT
Start: 2024-09-30

## 2024-10-02 ENCOUNTER — PATIENT MESSAGE (OUTPATIENT)
Dept: PRIMARY CARE | Facility: CLINIC | Age: 44
End: 2024-10-02
Payer: COMMERCIAL

## 2024-10-02 DIAGNOSIS — E03.9 HYPOTHYROIDISM, UNSPECIFIED TYPE: ICD-10-CM

## 2024-10-02 DIAGNOSIS — N76.0 RECURRENT VAGINITIS: ICD-10-CM

## 2024-10-02 RX ORDER — LEVOTHYROXINE SODIUM 75 UG/1
75 TABLET ORAL DAILY
Qty: 90 TABLET | Refills: 1 | Status: SHIPPED | OUTPATIENT
Start: 2024-10-02

## 2024-10-03 ENCOUNTER — APPOINTMENT (OUTPATIENT)
Dept: NEUROLOGY | Facility: CLINIC | Age: 44
End: 2024-10-03
Payer: COMMERCIAL

## 2024-10-03 VITALS
BODY MASS INDEX: 36.88 KG/M2 | WEIGHT: 235 LBS | HEIGHT: 67 IN | HEART RATE: 88 BPM | SYSTOLIC BLOOD PRESSURE: 123 MMHG | TEMPERATURE: 96.9 F | DIASTOLIC BLOOD PRESSURE: 83 MMHG

## 2024-10-03 DIAGNOSIS — G25.81 RESTLESS LEGS SYNDROME: ICD-10-CM

## 2024-10-03 DIAGNOSIS — G43.711 INTRACTABLE CHRONIC MIGRAINE WITHOUT AURA AND WITH STATUS MIGRAINOSUS: Primary | ICD-10-CM

## 2024-10-03 PROCEDURE — 3008F BODY MASS INDEX DOCD: CPT | Performed by: PSYCHIATRY & NEUROLOGY

## 2024-10-03 PROCEDURE — 1036F TOBACCO NON-USER: CPT | Performed by: PSYCHIATRY & NEUROLOGY

## 2024-10-03 PROCEDURE — 99213 OFFICE O/P EST LOW 20 MIN: CPT | Performed by: PSYCHIATRY & NEUROLOGY

## 2024-10-03 RX ORDER — PRAMIPEXOLE DIHYDROCHLORIDE 0.12 MG/1
TABLET ORAL
Qty: 90 TABLET | Refills: 3 | Status: SHIPPED | OUTPATIENT
Start: 2024-10-03

## 2024-10-03 RX ORDER — IBUPROFEN 800 MG/1
800 TABLET ORAL ONCE AS NEEDED
Qty: 30 TABLET | Refills: 5 | Status: SHIPPED | OUTPATIENT
Start: 2024-10-03 | End: 2025-04-01

## 2024-10-03 RX ORDER — SUMATRIPTAN 50 MG/1
50 TABLET, FILM COATED ORAL ONCE AS NEEDED
Qty: 9 TABLET | Refills: 11 | Status: SHIPPED | OUTPATIENT
Start: 2024-10-03 | End: 2025-10-03

## 2024-10-03 NOTE — PROGRESS NOTES
NEUROLOGY OUTPATIENT FOLLOW-UP NOTE    Assessment/Plan   Diagnoses and all orders for this visit:  Intractable chronic migraine without aura and with status migrainosus  -     ibuprofen 800 mg tablet; Take 1 tablet (800 mg) by mouth 1 time if needed (With sumatriptan at onset of migraine).  -     SUMAtriptan (Imitrex) 50 mg tablet; Take 1 tablet (50 mg) by mouth 1 time if needed (at onset of migraine, may repeat in 2 hours if no relief, max of 3 in one day.).  Restless legs syndrome  -     pramipexole (Mirapex) 0.125 mg tablet; Take 1 tablet once daily 2-3 hours before bedtime      IMPRESSION:  Intractable migraine     PLAN:  To continue topiramate 200 mg at bedtime for headache prophylaxis, and sumatriptan + ibuprofen at headache onset.  She can use ondansetron ODT for nausea with the migraines.  I ordered prednisone for if she has a headache for two days or more continuously.  She will use tizanidine for the cervical component to the headache.  Pramipexole for restless legs syndrome.  I will see her in six months or prn.      Adalberto Padgett Jr., M.D., FAAN   ----------    Subjective     Flower Guevara is a 44 y.o. year old female here for follow-up.    We were never able to get Aimovig approved.  Despite this, the current headache frequency is less than before, at about twice a week.  If she takes sumatriptan and ibuprofen 800 mg at headache onset, the headaches abort, and she occasionally has to take a second dose.  She denies other focal neurological symptoms including dysarthria, dysphagia, diplopia, focal weakness, focal sensory change, ataxia, vertigo, or bowel/bladder incontinence, among others.    HEADACHE HISTORY:     Headache type: Unilateral or bilateral (usually holocranial) pounding with photophobia, phonophobia, nausea, and emesis.      Number of headache days per week/month: 2 days a week     Number of months with headache frequency: 3 months     Abortive medications tried for migraine:  Excedrin Migraine, OTC acetaminophen, OTC ibuprofen, OTC naproxen, ASA, with only the Excedrin Migraine helpful, sumatriptan (helpful but runs out early), Ubrelvy (unhelpful), butalbital (not helpful)     Prophylactic medications tried for migraine: amitriptyline (unhelpful), venlafaxine (unhelpful), propranolol (unhelpful), topiramate (currently on, only partially helpful)    Past Medical History:   Diagnosis Date    Borderline personality disorder (Multi) 07/13/2020    Borderline personality disorder    Depression, unspecified 11/15/2022    Depression, unspecified depression type    Drug overdose, intentional (Multi) 02/02/2014    Hyperparathyroidism, unspecified (Multi) 11/06/2019    Hyperparathyroidism    Insomnia, unspecified 11/06/2019    Insomnia, unspecified type    Other specified personal risk factors, not elsewhere classified 11/06/2019    History of drug overdose    Personal history of diseases of the blood and blood-forming organs and certain disorders involving the immune mechanism     History of anemia    Personal history of other diseases of the digestive system 11/06/2019    History of irritable bowel syndrome    Personal history of other diseases of the digestive system 11/06/2019    History of hemorrhoids    Personal history of other diseases of the digestive system 11/06/2019    History of fatty infiltration of liver    Personal history of other diseases of the nervous system and sense organs 11/06/2019    History of restless legs syndrome    Personal history of other diseases of the nervous system and sense organs 11/06/2019    History of migraine    Personal history of other endocrine, nutritional and metabolic disease 11/06/2019    History of thyroid disease    Personal history of other infectious and parasitic diseases 11/07/2019    History of Helicobacter pylori infection    Personal history of other mental and behavioral disorders 11/06/2019    History of anxiety    Personal history of  other mental and behavioral disorders 11/06/2019    History of suicidal ideation    Personal history of other mental and behavioral disorders     History of mental disorder    Personal history of other specified conditions     History of tachycardia    Personal history of other specified conditions 11/06/2019    History of headache    Personal history of other specified conditions     History of snoring    Suicide attempt (Multi) 02/02/2014    Formatting of this note might be different from the original.   Suspected reason for overdosing    Suicide attempt (Multi) 02/02/2014    Formatting of this note might be different from the original.   Suspected reason for overdosing     Past Surgical History:   Procedure Laterality Date    OTHER SURGICAL HISTORY  06/18/2019    Hysterectomy    OTHER SURGICAL HISTORY  01/12/2021    Hemorrhoidectomy    OTHER SURGICAL HISTORY  05/15/2020    Tonsillectomy    OTHER SURGICAL HISTORY  01/24/2020    Endoscopy     Social History     Tobacco Use    Smoking status: Never    Smokeless tobacco: Never   Substance Use Topics    Alcohol use: Not Currently     family history includes Anxiety disorder in her father and mother; BRCA1 Positive in her father; Depression in her father and mother; Hypertension in her father; Schizophrenia in an other family member; kidney stones in her mother.    Current Outpatient Medications:     aspirin-acetaminophen-caffeine (Excedrin Extra Strength) 250-250-65 mg tablet, Take by mouth every 6 hours if needed., Disp: , Rfl:     cholecalciferol (Vitamin D-3) 50 mcg (2,000 unit) capsule, Take 1 capsule (50 mcg) by mouth once daily., Disp: , Rfl:     clindamycin (Cleocin T) 1 % lotion, Apply topically 2 times a day., Disp: 60 mL, Rfl: 11    clonazePAM (KlonoPIN) 0.5 mg tablet, Take 1 tablet (0.5 mg) by mouth 3 times a day as needed., Disp: , Rfl:     estradiol (Estring) 2 mg (7.5 mcg /24 hour) vaginal ring, Insert 2 mg into the vagina every 3 months. follow package  directions, Disp: 1 each, Rfl: 3    fluconazole (Diflucan) 150 mg tablet, Take one dose weekly for long term suppression therapy of recurrent yeast, Disp: 16 tablet, Rfl: 0    ibuprofen 800 mg tablet, Take 1 tablet (800 mg) by mouth every 8 hours if needed for mild pain (1 - 3)., Disp: , Rfl:     L. acidophilus/Bifid. animalis 32 billion cell capsule, Take by mouth., Disp: , Rfl:     lamoTRIgine (LaMICtal) 150 mg tablet, TAKE 2 TABLETS BY MOUTH AT BEDTIME. TOTAL DOSE 300MG, Disp: , Rfl:     levothyroxine (Synthroid, Levoxyl) 75 mcg tablet, Take 1 tablet (75 mcg) by mouth once daily., Disp: 90 tablet, Rfl: 1    lurasidone (Latuda) 40 mg tablet, TAKE 1 TABLET BY MOUTH IN THE EVENING WITH FOOD, Disp: , Rfl:     melatonin 10 mg capsule, Take by mouth., Disp: , Rfl:     multivit-mineral-iron-lutein tablet, Take 1 tablet by mouth once daily., Disp: , Rfl:     ondansetron ODT (Zofran-ODT) 8 mg disintegrating tablet, Take 1 tablet (8 mg) by mouth every 8 hours if needed for nausea or vomiting., Disp: 30 tablet, Rfl: 0    pramipexole (Mirapex) 0.125 mg tablet, TAKE 1 TABLET ONCE DAILY 2-3 HOURS BEFORE BEDTIME, Disp: 90 tablet, Rfl: 1    progesterone (Prometrium) 100 mg capsule, Take 1 capsule (100 mg) by mouth once daily. Take at bedtime, Disp: 30 capsule, Rfl: 11    sertraline (Zoloft) 50 mg tablet, Take 0.5 tablets (25 mg) by mouth once daily in the morning. Take before meals., Disp: , Rfl:     SUMAtriptan (Imitrex) 50 mg tablet, TAKE ONE TABLET BY MOUTH AT THE ONSET OF HEADACHE, CAN REPEAT IN 2 HOURS. MAX 4 TABLETS IN 1 DAYS, Disp: , Rfl:     tiZANidine (Zanaflex) 4 mg tablet, TAKE 1 TABLET (4 MG) BY MOUTH ONCE DAILY AT BEDTIME., Disp: 30 tablet, Rfl: 5    topiramate (Topamax) 100 mg tablet, Take 2 tablets (200 mg) by mouth once daily at bedtime., Disp: 180 tablet, Rfl: 3    valACYclovir (Valtrex) 500 mg tablet, TAKE 1 TABLET BY MOUTH ONCE DAILY. DURING OUTBREAKS, TAKE 1 TABLET TWICE DAILY FOR 3 DAYS, Disp: 90 tablet,  "Rfl: 1    zolpidem (Ambien) 10 mg tablet, Take 1 tablet (10 mg) by mouth as needed at bedtime., Disp: , Rfl:   Allergies   Allergen Reactions    Hydromorphone Itching       Objective     /83   Pulse 88   Temp 36.1 °C (96.9 °F)   Ht 1.702 m (5' 7\")   Wt 107 kg (235 lb)   BMI 36.81 kg/m²     CONSTITUTIONAL:  No acute distress    MENTAL STATUS:  Awake, alert, fully oriented to self, place, and time, with present short-term memory, good awareness of recent events, normal attention span, concentration, and fund of knowledge.    SPEECH AND LANGUAGE:  Can name and repeat, follows all commands, has no dysarthria    CRANIAL NERVES:  II-Vision present, visual fields full to confrontational testing    III/IV/VI--EOMs are present in all directions.  Pupils are symmetrically reactive in dim light.  No ptosis.    V--Normal facial sensation.    VII--No facial asymmetry.    VIII--Hearing present to voice bilaterally.    IX/X--Symmetric soft palate rise.    XI--Normal trapezius power bilaterally.    XII--Tongue protrudes without deviation.    MOTOR:  Normal power, tone, and bulk in both arms and both legs.    SENSORY:  Normal pin sensation in both arms and both legs without distal-proximal gradient, asymmetry, or spinal sensory level.    COORDINATION:  Normal finger-to-nose and heel-to-shin testing in both arms and both legs.    REFLEXES are normal and symmetric at the biceps, triceps, brachioradialis, patella, and ankle.  The plantar responses are flexor.    GAIT is normal, without steppage, ataxia, shuffling, or spasticity.      Adalberto Padgett Jr., M.D., FAAN       "

## 2024-10-04 ENCOUNTER — TELEPHONE (OUTPATIENT)
Dept: OBSTETRICS AND GYNECOLOGY | Facility: CLINIC | Age: 44
End: 2024-10-04
Payer: COMMERCIAL

## 2024-10-04 DIAGNOSIS — N76.0 RECURRENT VAGINITIS: ICD-10-CM

## 2024-10-04 RX ORDER — FLUCONAZOLE 150 MG/1
TABLET ORAL
Qty: 16 TABLET | Refills: 1 | Status: SHIPPED | OUTPATIENT
Start: 2024-10-04

## 2024-10-04 RX ORDER — FLUCONAZOLE 150 MG/1
TABLET ORAL
Qty: 16 TABLET | Refills: 2 | OUTPATIENT
Start: 2024-10-04

## 2024-10-04 RX ORDER — FLUCONAZOLE 150 MG/1
TABLET ORAL
Qty: 4 TABLET | Refills: 3 | OUTPATIENT
Start: 2024-10-04

## 2024-10-17 ENCOUNTER — PATIENT MESSAGE (OUTPATIENT)
Dept: PRIMARY CARE | Facility: CLINIC | Age: 44
End: 2024-10-17
Payer: COMMERCIAL

## 2024-10-17 DIAGNOSIS — K58.1 IRRITABLE BOWEL SYNDROME WITH CONSTIPATION: ICD-10-CM

## 2024-10-17 RX ORDER — PLECANATIDE 3 MG/1
3 TABLET ORAL DAILY
Qty: 30 TABLET | Refills: 11 | Status: SHIPPED | OUTPATIENT
Start: 2024-10-17 | End: 2025-10-17

## 2024-10-30 ENCOUNTER — APPOINTMENT (OUTPATIENT)
Dept: DERMATOLOGY | Facility: CLINIC | Age: 44
End: 2024-10-30
Payer: COMMERCIAL

## 2024-10-30 DIAGNOSIS — L81.4 LENTIGO: ICD-10-CM

## 2024-10-30 DIAGNOSIS — Z85.828 PERSONAL HISTORY OF SKIN CANCER: ICD-10-CM

## 2024-10-30 DIAGNOSIS — L57.9 SKIN CHANGES DUE TO CHRONIC EXPOSURE TO NONIONIZING RADIATION: ICD-10-CM

## 2024-10-30 DIAGNOSIS — D18.01 HEMANGIOMA OF SKIN: ICD-10-CM

## 2024-10-30 DIAGNOSIS — L91.0 KELOID: Primary | ICD-10-CM

## 2024-10-30 PROCEDURE — 99213 OFFICE O/P EST LOW 20 MIN: CPT | Performed by: STUDENT IN AN ORGANIZED HEALTH CARE EDUCATION/TRAINING PROGRAM

## 2024-10-30 PROCEDURE — 11900 INJECT SKIN LESIONS </W 7: CPT | Performed by: STUDENT IN AN ORGANIZED HEALTH CARE EDUCATION/TRAINING PROGRAM

## 2024-10-30 RX ORDER — TRIAMCINOLONE ACETONIDE 40 MG/ML
8 INJECTION, SUSPENSION INTRA-ARTICULAR; INTRAMUSCULAR ONCE
Status: COMPLETED | OUTPATIENT
Start: 2024-10-30 | End: 2024-10-30

## 2024-11-11 DIAGNOSIS — G43.809 OTHER MIGRAINE WITHOUT STATUS MIGRAINOSUS, NOT INTRACTABLE: ICD-10-CM

## 2024-11-12 RX ORDER — ONDANSETRON 8 MG/1
8 TABLET, ORALLY DISINTEGRATING ORAL EVERY 8 HOURS PRN
Qty: 30 TABLET | Refills: 0 | Status: SHIPPED | OUTPATIENT
Start: 2024-11-12

## 2024-12-12 DIAGNOSIS — G43.809 OTHER MIGRAINE WITHOUT STATUS MIGRAINOSUS, NOT INTRACTABLE: ICD-10-CM

## 2024-12-12 RX ORDER — ONDANSETRON 8 MG/1
8 TABLET, ORALLY DISINTEGRATING ORAL EVERY 8 HOURS PRN
Qty: 30 TABLET | Refills: 0 | Status: SHIPPED | OUTPATIENT
Start: 2024-12-12

## 2024-12-19 ENCOUNTER — OFFICE VISIT (OUTPATIENT)
Dept: GASTROENTEROLOGY | Facility: CLINIC | Age: 44
End: 2024-12-19
Payer: COMMERCIAL

## 2024-12-19 ENCOUNTER — LAB (OUTPATIENT)
Dept: LAB | Facility: LAB | Age: 44
End: 2024-12-19
Payer: COMMERCIAL

## 2024-12-19 VITALS
HEIGHT: 67 IN | HEART RATE: 85 BPM | TEMPERATURE: 98.4 F | BODY MASS INDEX: 34.37 KG/M2 | WEIGHT: 219 LBS | DIASTOLIC BLOOD PRESSURE: 87 MMHG | SYSTOLIC BLOOD PRESSURE: 122 MMHG

## 2024-12-19 DIAGNOSIS — A04.8 H. PYLORI INFECTION: Primary | ICD-10-CM

## 2024-12-19 DIAGNOSIS — K58.1 IRRITABLE BOWEL SYNDROME WITH CONSTIPATION: ICD-10-CM

## 2024-12-19 DIAGNOSIS — A04.8 H. PYLORI INFECTION: ICD-10-CM

## 2024-12-19 DIAGNOSIS — K64.9 HEMORRHOIDS, UNSPECIFIED HEMORRHOID TYPE: ICD-10-CM

## 2024-12-19 PROCEDURE — 83013 H PYLORI (C-13) BREATH: CPT

## 2024-12-19 PROCEDURE — 99204 OFFICE O/P NEW MOD 45 MIN: CPT | Performed by: NURSE PRACTITIONER

## 2024-12-19 PROCEDURE — 99214 OFFICE O/P EST MOD 30 MIN: CPT | Performed by: NURSE PRACTITIONER

## 2024-12-19 PROCEDURE — 3008F BODY MASS INDEX DOCD: CPT | Performed by: NURSE PRACTITIONER

## 2024-12-19 PROCEDURE — 1036F TOBACCO NON-USER: CPT | Performed by: NURSE PRACTITIONER

## 2024-12-19 RX ORDER — PLECANATIDE 3 MG/1
3 TABLET ORAL DAILY
Qty: 90 TABLET | Refills: 3 | Status: SHIPPED | OUTPATIENT
Start: 2024-12-19 | End: 2025-12-19

## 2024-12-19 RX ORDER — SERTRALINE HYDROCHLORIDE 50 MG/1
50 TABLET, FILM COATED ORAL
COMMUNITY
Start: 2024-11-08

## 2024-12-19 RX ORDER — HYDROCORTISONE 25 MG/G
CREAM TOPICAL 2 TIMES DAILY
Qty: 28 G | Refills: 3 | Status: SHIPPED | OUTPATIENT
Start: 2024-12-19

## 2024-12-19 RX ORDER — LIDOCAINE 50 MG/G
OINTMENT TOPICAL 2 TIMES DAILY PRN
Qty: 30 G | Refills: 1 | Status: SHIPPED | OUTPATIENT
Start: 2024-12-19 | End: 2025-12-19

## 2024-12-19 RX ORDER — HYDROXYZINE PAMOATE 25 MG/1
1 CAPSULE ORAL
COMMUNITY
Start: 2024-12-12

## 2024-12-19 RX ORDER — DICYCLOMINE HYDROCHLORIDE 10 MG/1
10 CAPSULE ORAL
Qty: 120 CAPSULE | Refills: 11 | Status: SHIPPED | OUTPATIENT
Start: 2024-12-19 | End: 2025-12-19

## 2024-12-19 ASSESSMENT — ENCOUNTER SYMPTOMS
PSYCHIATRIC NEGATIVE: 1
EYES NEGATIVE: 1
RESPIRATORY NEGATIVE: 1
NEUROLOGICAL NEGATIVE: 1
ALLERGIC/IMMUNOLOGIC NEGATIVE: 1
ENDOCRINE NEGATIVE: 1
CONSTITUTIONAL NEGATIVE: 1
ABDOMINAL PAIN: 1
CARDIOVASCULAR NEGATIVE: 1
MUSCULOSKELETAL NEGATIVE: 1
CONSTIPATION: 1
HEMATOLOGIC/LYMPHATIC NEGATIVE: 1

## 2024-12-19 NOTE — PATIENT INSTRUCTIONS
1.  Screening colonoscopy you are due for this procedure and I would recommend that you proceed with the colonoscopy that is already been scheduled.  2.  Irritable bowel syndrome with constipation-  I will re-order the Trulance as you have had a good response to this in the past. I will also order dicyclomine that you can use up to 4 times daily for abdominal cramping as needed.  3.  History of H. pylori with treatment I would recommend a urea breath test to check for eradication of this bacteria.    I will contact you with your results and determine follow up

## 2024-12-19 NOTE — PROGRESS NOTES
Subjective   Patient ID: Flower Guevara is a 44 y.o. female who presents for New Patient Visit.  HPI  44-year-old female for new patient visit for evaluation of irritable bowel syndrome with constipation  Medical history includes IBS-C, migraines, restless leg syndrome, anxiety, depression, PTSD, hypothyroidism, endometriosis, hemorrhoidectomy in the past  Previously seen by Dr. Rayo Brewer gastroenterologist  8/19/2019 EGD showed a small hiatal hernia, bilious gastric fluid, gastritis, normal duodenum and H. Pylori  7/18/2024 TSH 2.02  6/6/2024 normal CMP, H&H and vitamin D  10/16/2023 abdominal x-ray was unremarkable  She tried Linzess in the past without good effect and is currently on Trulance  Has used miralax, metamucil, mineral oil, colace in the past and didn't help  Trulance is working well  BM: every couple of days   Using it every 2-3 times per week  Feels complete  Some pain at times  Eating good   Fiber - fruits and vegetables supplement  Water- lots          Review of Systems   Constitutional: Negative.    HENT: Negative.     Eyes: Negative.    Respiratory: Negative.     Cardiovascular: Negative.    Gastrointestinal:  Positive for abdominal pain and constipation.   Endocrine: Negative.    Genitourinary: Negative.    Musculoskeletal: Negative.    Skin: Negative.    Allergic/Immunologic: Negative.    Neurological: Negative.    Hematological: Negative.    Psychiatric/Behavioral: Negative.         Objective   Physical Exam  Constitutional:       Appearance: Normal appearance.   HENT:      Head: Normocephalic.      Nose: Nose normal.      Mouth/Throat:      Mouth: Mucous membranes are moist.   Eyes:      Pupils: Pupils are equal, round, and reactive to light.   Cardiovascular:      Rate and Rhythm: Normal rate and regular rhythm.      Pulses: Normal pulses.      Heart sounds: Normal heart sounds.   Pulmonary:      Effort: Pulmonary effort is normal.      Breath sounds: Normal breath sounds.    Abdominal:      General: Bowel sounds are normal.      Palpations: Abdomen is soft.   Musculoskeletal:         General: Normal range of motion.      Cervical back: Normal range of motion and neck supple.   Skin:     General: Skin is warm and dry.   Neurological:      Mental Status: She is alert.   Psychiatric:         Mood and Affect: Mood normal.         Assessment/Plan        1.  Screening colonoscopy you are due for this procedure and I would recommend that you proceed with the colonoscopy that is already been scheduled.  2.  Irritable bowel syndrome with constipation-  I will re-order the Trulance as you have had a good response to this in the past. I will also order dicyclomine that you can use up to 4 times daily for abdominal cramping as needed.  3.  History of H. pylori with treatment I would recommend a urea breath test to check for eradication of this bacteria.    I will contact you with your results and determine follow up    DELIA Terry 12/19/24 2:17 PM

## 2024-12-20 LAB — UREA BREATH TEST QL: NEGATIVE

## 2024-12-29 DIAGNOSIS — M54.2 CERVICALGIA: ICD-10-CM

## 2025-01-01 RX ORDER — TIZANIDINE 4 MG/1
4 TABLET ORAL NIGHTLY
Qty: 30 TABLET | Refills: 5 | Status: SHIPPED | OUTPATIENT
Start: 2025-01-01

## 2025-02-12 DIAGNOSIS — N95.8 GENITOURINARY SYNDROME OF MENOPAUSE: Primary | ICD-10-CM

## 2025-02-12 RX ORDER — ESTRADIOL 10 UG/1
10 INSERT VAGINAL 2 TIMES WEEKLY
Qty: 8 TABLET | Refills: 11 | Status: SHIPPED | OUTPATIENT
Start: 2025-02-12

## 2025-02-17 ENCOUNTER — APPOINTMENT (OUTPATIENT)
Dept: DERMATOLOGY | Facility: CLINIC | Age: 45
End: 2025-02-17
Payer: COMMERCIAL

## 2025-02-17 DIAGNOSIS — L71.9 ROSACEA: ICD-10-CM

## 2025-02-17 DIAGNOSIS — L91.0 KELOID: Primary | ICD-10-CM

## 2025-02-17 PROCEDURE — 1036F TOBACCO NON-USER: CPT | Performed by: DERMATOLOGY

## 2025-02-17 PROCEDURE — 99213 OFFICE O/P EST LOW 20 MIN: CPT | Performed by: DERMATOLOGY

## 2025-02-17 PROCEDURE — 11900 INJECT SKIN LESIONS </W 7: CPT | Performed by: DERMATOLOGY

## 2025-02-17 RX ORDER — MULTIVITAMIN
1 TABLET ORAL
COMMUNITY
Start: 2024-12-24

## 2025-02-17 RX ORDER — TRIAMCINOLONE ACETONIDE 40 MG/ML
8 INJECTION, SUSPENSION INTRA-ARTICULAR; INTRAMUSCULAR ONCE
Status: COMPLETED | OUTPATIENT
Start: 2025-02-17 | End: 2025-02-17

## 2025-02-17 RX ORDER — DULOXETIN HYDROCHLORIDE 30 MG/1
30 CAPSULE, DELAYED RELEASE ORAL EVERY MORNING
COMMUNITY
Start: 2025-02-12

## 2025-02-17 RX ADMIN — TRIAMCINOLONE ACETONIDE 8 MG: 40 INJECTION, SUSPENSION INTRA-ARTICULAR; INTRAMUSCULAR at 08:37

## 2025-02-17 NOTE — PROGRESS NOTES
Subjective     Flower Guevara is a 44 y.o. female who presents for the following: Keloid (Left anterior superior neck- pt states history of MOHS surgery in site, history of injection. Accompanied by Jeremiah Haque. ).     Review of Systems:  No other skin or systemic complaints other than what is documented elsewhere in the note.    The following portions of the chart were reviewed this encounter and updated as appropriate:   Tobacco  Allergies  Meds  Problems  Med Hx  Surg Hx  Fam Hx                Objective   Well appearing patient in no apparent distress; mood and affect are within normal limits.    A focused skin examination was performed. All findings within normal limits unless otherwise noted below.    Assessment/Plan   1. Keloid  Left Anterior Neck  Fibrotic nodular plaque    -Discussed nature of condition, occurred after BCC excision from L anterior superior neck, March 2024  -Discussed treatment options  -After discussion, patient wishes to proceed with intralesional kenalog injections.    -After history, physical examination and discussion, a decision was made to proceed with intralesional kenalog therapy today  -Risks, benefits and alternatives of intralesional steroids was discussed with patient including the risk of atrophy, striae, telangiectasia, and pigmentary changes. Patient expresses understanding of these risks and verbal consent was obtained from the patient to treat with intralesional Kenalog.      Intralesional injection - Left Anterior Neck  Intralesional Injection:   Consent:     Consent obtained:  Verbal    Consent given by:  Patient    Risks discussed:  Poor cosmetic result, pain, infection and bleeding    Alternatives discussed:  No treatment  Pre-Procedure Details:     Prep Type:  Isopropyl alcohol  Procedure Details:   Injection:  Triamcinolone  Outcome: patient tolerated procedure well  Post-procedure details: sterile dressing applied and wound care instructions  given  Dressing type: bandage   Comments:  A total of 0.2 ml of 40 mg/mL Kenalog were injected into 1 lesion(s)  Lot #: [id number]  Expiration: 5/2026    Related Medications  triamcinolone acetonide (Kenalog-40) injection 8 mg      2. Rosacea  Head - Anterior (Face)  Erythema and telangiectasias, micropustules    Inadequately controled  -Sunscreen samples given today  -Patient using clindamycin lotion  -Patient wanted face examined as states that sometimes area of rosacea are frozen with LN2. Discussed no evidence of Aks on exam today.        Follow up as scheduled with Dr Espitia for FSE in May 2025  Discussed if there are any changes or development of concerning symptoms (lesion/skin condition is changing, bleeding, enlarging, or worsening) the patient is to contact my office. The patient verbalizes understanding.    Rachana Moreno MD  2/17/2025

## 2025-03-24 DIAGNOSIS — Z79.890 MENOPAUSAL SYNDROME ON HORMONE REPLACEMENT THERAPY: ICD-10-CM

## 2025-03-24 DIAGNOSIS — N95.1 MENOPAUSAL SYNDROME ON HORMONE REPLACEMENT THERAPY: ICD-10-CM

## 2025-03-30 RX ORDER — PROGESTERONE 100 MG/1
100 CAPSULE ORAL DAILY
Qty: 90 CAPSULE | Refills: 3 | Status: SHIPPED | OUTPATIENT
Start: 2025-03-30

## 2025-04-03 ENCOUNTER — APPOINTMENT (OUTPATIENT)
Dept: NEUROLOGY | Facility: CLINIC | Age: 45
End: 2025-04-03
Payer: COMMERCIAL

## 2025-04-03 VITALS
TEMPERATURE: 96 F | BODY MASS INDEX: 32.02 KG/M2 | HEIGHT: 67 IN | DIASTOLIC BLOOD PRESSURE: 79 MMHG | HEART RATE: 98 BPM | SYSTOLIC BLOOD PRESSURE: 118 MMHG | WEIGHT: 204 LBS

## 2025-04-03 DIAGNOSIS — G43.711 INTRACTABLE CHRONIC MIGRAINE WITHOUT AURA AND WITH STATUS MIGRAINOSUS: Primary | ICD-10-CM

## 2025-04-03 DIAGNOSIS — G25.81 RESTLESS LEGS SYNDROME: ICD-10-CM

## 2025-04-03 PROCEDURE — 1036F TOBACCO NON-USER: CPT | Performed by: PSYCHIATRY & NEUROLOGY

## 2025-04-03 PROCEDURE — 3008F BODY MASS INDEX DOCD: CPT | Performed by: PSYCHIATRY & NEUROLOGY

## 2025-04-03 PROCEDURE — 99213 OFFICE O/P EST LOW 20 MIN: CPT | Performed by: PSYCHIATRY & NEUROLOGY

## 2025-04-03 RX ORDER — TOPIRAMATE 100 MG/1
200 TABLET, FILM COATED ORAL NIGHTLY
Qty: 180 TABLET | Refills: 3 | Status: SHIPPED | OUTPATIENT
Start: 2025-04-03 | End: 2026-04-03

## 2025-04-04 DIAGNOSIS — K58.1 IRRITABLE BOWEL SYNDROME WITH CONSTIPATION: Primary | ICD-10-CM

## 2025-04-07 DIAGNOSIS — K58.1 IRRITABLE BOWEL SYNDROME WITH CONSTIPATION: Primary | ICD-10-CM

## 2025-04-09 DIAGNOSIS — G43.711 INTRACTABLE CHRONIC MIGRAINE WITHOUT AURA AND WITH STATUS MIGRAINOSUS: ICD-10-CM

## 2025-04-09 ASSESSMENT — ENCOUNTER SYMPTOMS
LEG PAIN: 0
BACK PAIN: 1
PARESTHESIAS: 0
HEADACHES: 1
FEVER: 0
NUMBNESS: 0
ABDOMINAL PAIN: 0
TINGLING: 0
WEIGHT LOSS: 0
PARESIS: 0
WEAKNESS: 0
PERIANAL NUMBNESS: 0
DYSURIA: 0
BOWEL INCONTINENCE: 0

## 2025-04-14 RX ORDER — IBUPROFEN 800 MG/1
800 TABLET ORAL ONCE AS NEEDED
Qty: 30 TABLET | Refills: 5 | Status: SHIPPED | OUTPATIENT
Start: 2025-04-14 | End: 2025-10-11

## 2025-04-16 ENCOUNTER — APPOINTMENT (OUTPATIENT)
Dept: PRIMARY CARE | Facility: CLINIC | Age: 45
End: 2025-04-16
Payer: COMMERCIAL

## 2025-04-16 DIAGNOSIS — Z12.31 SCREENING MAMMOGRAM FOR BREAST CANCER: ICD-10-CM

## 2025-04-16 DIAGNOSIS — G43.809 OTHER MIGRAINE WITHOUT STATUS MIGRAINOSUS, NOT INTRACTABLE: ICD-10-CM

## 2025-04-16 DIAGNOSIS — E03.9 HYPOTHYROIDISM, UNSPECIFIED TYPE: ICD-10-CM

## 2025-04-16 DIAGNOSIS — J06.9 UPPER RESPIRATORY TRACT INFECTION, UNSPECIFIED TYPE: Primary | ICD-10-CM

## 2025-04-16 DIAGNOSIS — B00.9 HSV-2 (HERPES SIMPLEX VIRUS 2) INFECTION: ICD-10-CM

## 2025-04-16 PROCEDURE — 99214 OFFICE O/P EST MOD 30 MIN: CPT | Performed by: FAMILY MEDICINE

## 2025-04-16 RX ORDER — OSELTAMIVIR PHOSPHATE 75 MG/1
75 CAPSULE ORAL 2 TIMES DAILY
Qty: 10 CAPSULE | Refills: 0 | Status: SHIPPED | OUTPATIENT
Start: 2025-04-16 | End: 2025-04-21

## 2025-04-16 RX ORDER — VALACYCLOVIR HYDROCHLORIDE 500 MG/1
TABLET, FILM COATED ORAL
Qty: 90 TABLET | Refills: 1 | Status: SHIPPED | OUTPATIENT
Start: 2025-04-16

## 2025-04-16 RX ORDER — LEVOTHYROXINE SODIUM 75 UG/1
75 TABLET ORAL DAILY
Qty: 90 TABLET | Refills: 0 | Status: SHIPPED | OUTPATIENT
Start: 2025-04-16

## 2025-04-16 RX ORDER — ONDANSETRON 8 MG/1
8 TABLET, ORALLY DISINTEGRATING ORAL EVERY 8 HOURS PRN
Qty: 30 TABLET | Refills: 2 | Status: SHIPPED | OUTPATIENT
Start: 2025-04-16

## 2025-04-16 NOTE — PROGRESS NOTES
Answers submitted by the patient for this visit:  Back Pain Questionnaire (Submitted on 4/9/2025)  Chief Complaint: Back pain  Chronicity: new  Onset: 1 to 4 weeks ago  Frequency: 2 to 4 times per day  Progression since onset: gradually worsening  Pain location: thoracic spine  Pain quality: stabbing  Radiates to: does not radiate  Pain - numeric: 8/10  Pain is: the same all the time  Aggravated by: bending, coughing, position, twisting  Stiffness is present: all day  abdominal pain: No  bladder incontinence: No  bowel incontinence: No  chest pain: No  fever: No  headaches: Yes  leg pain: No  numbness: No  perianal numbness: No  dysuria: No  paresis: No  pelvic pain: No  paresthesias: No  tingling: No  weakness: No  weight loss: No  Risk factors: history of cancer, menopause, obesity, poor posture, pregnancy            Chief Complaint:  No chief complaint on file.  History Of Present Illness:   Flower Guevara is a 44 y.o. female       Virtual visit.  Virtual or Telephone Consent    An interactive audio and video telecommunication system which permits real time communications between the patient (at the originating site) and provider (at the distant site) was utilized to provide this telehealth service.   Verbal consent was requested and obtained from Flower Guevara on this date, 04/16/25 for a telehealth visit and the patient's location was confirmed at the time of the visit.     Valtrex- taking daily to prevent outbreaks. Refilling 4/16/25    Synthroid- pt states she feels tired a lot. But 75mcg is her dosage. Agree to refill for 90 days.       Migraine history: pt requesting zofran refill.     She is going on a cruise this weekend.  - she was at the pharmacy the other day and she is inquiring about a flu test/covid test.   - pt would like tamiflu rx.      4/14/25 went to [facility] ER for rib pain. CXR normal. And was given a 'cream' for her eye.        Healthcare team:  - LEONEL Camejo PCP  - GYN  - dermatology  -  neurology  - psychiatry  - GI            Allergies: hydromorphone         Social History:  Living situation- house  Work- [employer] disability  School- none  Alcohol-    Tobacco  Packs per day/years/quit date- none          Immunizations:  Influenza-  Up to date this most recent flu season  Tdap (every 10 years)- pt states up to date  Zoster (Shingrix)  (Age 50 or older: set of 2 shingrix, second is administered 2-6 months after first shingrix)  Pneumococcal (PCV 20)  (Over age 50; or age 19-49 with predisposing chronic medical conditions)      Patient is aware that they will need to go to local pharmacy to get immunizations that are not offered in clinic.          Health Maintenance   - Colonoscopy (45-75)- LEONEL Camejo has ordered.    - Cologuard- never    Mammogram (female, 40+)- June 2024.         GYN: has a GYN         Review of Systems (BOLD if positive, delete if not asked):     Constitutional:   - fever   - chills   - night sweats  - unexpected weight change       Eyes:   - loss of vision  - double vision  - floaters     Ear/Nose/Throat/Mouth:   - hearing changes  - sore throat  - sinus congestion     Cardiovascular:   - chest pain  - chest heaviness  - palpitations  - swelling in ankles       Respiratory:   - shortness of breath  - difficulty breathing  - frequent cough  - wheezing    Musculoskeletal:   - bone pain  - muscle pain  - joint pain   -low back pain       Neurological:   - headache  - loss of consciousness  - tremors  - dizzy spells  - numbness   - tingling       Gastrointestinal:   - abdominal pain  - nausea  - vomiting  - constipation  - diarrhea  - bloody stools  - loss of bowel control  - heartburn       Genitourinary:   - urinary incontinence  - increased urinary frequency  - painful urination  - blood in urine     Skin:   - Rash  - lumps or bumps  - worrisome moles     Endocrine:   - excessive thirst  - feeling too hot  - feeling too cold  - fatigue    Hematologic/Lymphatic:   - swollen glands  -  blood clotting problems  - easy bruising                   Psychological:   - feeling safe at home          Last Recorded Vitals:  There were no vitals filed for this visit.     Past Medical History:  She has a past medical history of Borderline personality disorder (Multi) (07/13/2020), Depression, unspecified (11/15/2022), Drug overdose, intentional (Multi) (02/02/2014), Hyperparathyroidism, unspecified (Multi) (11/06/2019), Insomnia, unspecified (11/06/2019), Other specified personal risk factors, not elsewhere classified (11/06/2019), Personal history of diseases of the blood and blood-forming organs and certain disorders involving the immune mechanism, Personal history of other diseases of the digestive system (11/06/2019), Personal history of other diseases of the digestive system (11/06/2019), Personal history of other diseases of the digestive system (11/06/2019), Personal history of other diseases of the nervous system and sense organs (11/06/2019), Personal history of other diseases of the nervous system and sense organs (11/06/2019), Personal history of other endocrine, nutritional and metabolic disease (11/06/2019), Personal history of other infectious and parasitic diseases (11/07/2019), Personal history of other mental and behavioral disorders (11/06/2019), Personal history of other mental and behavioral disorders (11/06/2019), Personal history of other mental and behavioral disorders, Personal history of other specified conditions, Personal history of other specified conditions (11/06/2019), Personal history of other specified conditions, Suicide attempt (Multi) (02/02/2014), and Suicide attempt (Multi) (02/02/2014).     Past Surgical History:  She has a past surgical history that includes Other surgical history (06/18/2019); Other surgical history (01/12/2021); Other surgical history (05/15/2020); and Other surgical history (01/24/2020).     Social History:  She reports that she has never smoked. She  has never used smokeless tobacco. She reports that she does not currently use alcohol. She reports that she does not use drugs.     Family History:  Family History[1]  Allergies:  Hydromorphone     Outpatient Medications:  Current Outpatient Medications   Medication Instructions    aspirin-acetaminophen-caffeine (Excedrin Extra Strength) 250-250-65 mg tablet Every 6 hours PRN    cholecalciferol (Vitamin D-3) 50 mcg (2,000 unit) capsule 1 capsule, Daily    clindamycin (Cleocin T) 1 % lotion Topical, 2 times daily    clonazePAM (KLONOPIN) 0.5 mg, 3 times daily PRN    Daily-Filipe tablet 1 tablet, Daily (0630)    dicyclomine (BENTYL) 10 mg, oral, 4 times daily before meals and nightly, Take with meals and at bedtime    DULoxetine (CYMBALTA) 30 mg, Every morning    estradiol (VAGIFEM) 10 mcg, vaginal, 2 times weekly    fluconazole (Diflucan) 150 mg tablet Take one dose weekly for long term suppression therapy of recurrent yeast    hydrocortisone (Proctosol HC) 2.5 % rectal cream rectal, 2 times daily    hydrOXYzine pamoate (Vistaril) 25 mg capsule 1 capsule, Every 12 hours scheduled (0630,1830)    ibuprofen 800 mg, oral, Once as needed    L. acidophilus/Bifid. animalis 32 billion cell capsule Take by mouth.    lamoTRIgine (LaMICtal) 150 mg tablet TAKE 2 TABLETS BY MOUTH AT BEDTIME. TOTAL DOSE 300MG    levothyroxine (SYNTHROID, LEVOXYL) 75 mcg, oral, Daily    lidocaine (Xylocaine) 5 % ointment Topical, 2 times daily PRN    linaCLOtide (LINZESS) 72 mcg, oral, Daily before breakfast, Do not crush or chew.    lurasidone (Latuda) 40 mg tablet TAKE 1 TABLET BY MOUTH IN THE EVENING WITH FOOD    melatonin 10 mg capsule Take by mouth.    multivit-mineral-iron-lutein tablet 1 tablet, Daily    ondansetron ODT (ZOFRAN-ODT) 8 mg, oral, Every 8 hours PRN    pramipexole (Mirapex) 0.125 mg tablet Take 1 tablet once daily 2-3 hours before bedtime    progesterone (PROMETRIUM) 100 mg, oral, Daily, Take at bedtime    SUMAtriptan (IMITREX) 50  mg, oral, Once as needed    tiZANidine (ZANAFLEX) 4 mg, oral, Nightly    topiramate (TOPAMAX) 200 mg, oral, Nightly    valACYclovir (Valtrex) 500 mg tablet TAKE 1 TABLET BY MOUTH ONCE DAILY. DURING OUTBREAKS, TAKE 1 TABLET TWICE DAILY FOR 3 DAYS    zolpidem (AMBIEN) 10 mg, Nightly PRN        Physical Exam:  GENERAL: Well developed, well nourished, alert and cooperative, and appears to be in no acute distress.     PSYCH: mood pleasant and appropriate     HEAD: normocephalic     EYES: PERRL, EOMI. vision is grossly intact.        LUNGS: Good respiratory effort.           Last Labs:  CBC -  Lab Results   Component Value Date    WBC 8.8 06/06/2024    HGB 14.4 06/06/2024    HCT 46.1 (H) 06/06/2024     (H) 06/06/2024     06/06/2024        CMP -  Lab Results   Component Value Date    CALCIUM 9.3 06/06/2024    PHOS 3.1 11/21/2020    PROT 6.8 06/06/2024    ALBUMIN 4.1 06/06/2024    AST 21 06/06/2024    ALT 27 06/06/2024    ALKPHOS 89 06/06/2024    BILITOT 0.4 06/06/2024        LIPID PANEL -  Lab Results   Component Value Date    CHOL 161 06/06/2024    TRIG 255 (H) 06/06/2024    HDL 45.5 06/06/2024    CHHDL 3.5 06/06/2024    LDLF 108 (H) 11/15/2022    VLDL 51 (H) 06/06/2024    NHDL 116 06/06/2024           Lab Results   Component Value Date    BNP 95 07/14/2022    HGBA1C 5.0 06/06/2024          BI mammo bilateral screening tomosynthesis  Narrative: Interpreted By:  Lenny Carney,   STUDY:  BI MAMMO BILATERAL SCREENING TOMOSYNTHESIS; 7/8/2024 4:41 pm      ACCESSION NUMBER(S):  [id number]      ORDERING CLINICIAN:  PAZ MILLS      INDICATION:  Screening.      COMPARISON:  07/06/2023, 10/20/2021      FINDINGS:  2D and tomosynthesis images were reviewed at 1 mm slice thickness.      Density:  There are areas of scattered fibroglandular tissue.      No suspicious masses or calcifications are identified.      Impression: No mammographic evidence of malignancy.      BI-RADS CATEGORY:  BI-RADS Category:  1  Negative.  Recommendation:  Annual Screening.  Recommended Date:  1 Year.  Laterality:  Bilateral.      Based on the Tyrer-Cuzick model for breast cancer risk assessment,  this patient is at an elevated risk of developing breast cancer and  may benefit from additional screening with breast MRI or ultrasound.  Please note that this estimate is based on responses provided on the  patient questionnaire. For more information regarding high risk  consultation, please call [phone].      For any future breast imaging appointments, please call [phone]  ([phone]).          MACRO:  None      Signed by: Lenny Carney 7/13/2024 10:46 PM  Dictation workstation:   [id number]         Assessment/Plan   Problem List Items Addressed This Visit           ICD-10-CM    HSV-2 (herpes simplex virus 2) infection B00.9    Hypothyroidism E03.9    Migraines G43.909     Other Visit Diagnoses         Codes      Upper respiratory tract infection, unspecified type    -  Primary J06.9             Nice to meet you virtually.     Tamiflu and flu test sent to pharmacy for upcoming cruise.    Refills done.     Make sure that you are established with a GYN provider for routine OB/GYN care.       Referrals and advanced imaging scheduling line: [phone].  Another scheduling number is: [phone].  Another potential phone number is: [phone] ([phone]).  The number for pediatric referrals is: [phone].    Mammogram ordered.          No work note needed.      As we agreed to today, I do not complete disability paperwork, or acts as the 'physician of record' in [facility] cases.        Pt reports they will follow up with the new PCP that begins in this office             Abraham Diane DO       [1]   Family History  Problem Relation Name Age of Onset    Anxiety disorder Mother      Depression Mother      Other (kidney stones) Mother      BRCA1 Positive Father      Anxiety disorder Father      Depression Father      Hypertension  Father      Schizophrenia Other

## 2025-04-29 ENCOUNTER — TELEPHONE (OUTPATIENT)
Dept: PRIMARY CARE | Facility: CLINIC | Age: 45
End: 2025-04-29
Payer: COMMERCIAL

## 2025-04-29 DIAGNOSIS — Z12.11 SCREENING FOR COLORECTAL CANCER: Primary | ICD-10-CM

## 2025-04-29 DIAGNOSIS — Z12.12 SCREENING FOR COLORECTAL CANCER: Primary | ICD-10-CM

## 2025-04-29 NOTE — TELEPHONE ENCOUNTER
Pt called bc she is trying to schedule her colonoscopy and they are scheduling out past August and that's when her referral from prisca will . Can you put through a new one for her.

## 2025-05-01 ENCOUNTER — APPOINTMENT (OUTPATIENT)
Dept: DERMATOLOGY | Facility: CLINIC | Age: 45
End: 2025-05-01
Payer: COMMERCIAL

## 2025-05-01 DIAGNOSIS — L57.0 ACTINIC KERATOSIS: ICD-10-CM

## 2025-05-01 DIAGNOSIS — Z12.11 COLON CANCER SCREENING: Primary | ICD-10-CM

## 2025-05-01 DIAGNOSIS — L82.1 SEBORRHEIC KERATOSIS: ICD-10-CM

## 2025-05-01 DIAGNOSIS — D18.01 HEMANGIOMA OF SKIN: ICD-10-CM

## 2025-05-01 DIAGNOSIS — D22.5 MELANOCYTIC NEVUS OF TRUNK: ICD-10-CM

## 2025-05-01 DIAGNOSIS — L73.9 FOLLICULITIS: ICD-10-CM

## 2025-05-01 DIAGNOSIS — Z85.828 HISTORY OF NONMELANOMA SKIN CANCER: ICD-10-CM

## 2025-05-01 DIAGNOSIS — L57.8 DIFFUSE PHOTODAMAGE OF SKIN: ICD-10-CM

## 2025-05-01 DIAGNOSIS — D48.5 NEOPLASM OF UNCERTAIN BEHAVIOR OF SKIN: Primary | ICD-10-CM

## 2025-05-01 PROCEDURE — 17000 DESTRUCT PREMALG LESION: CPT | Performed by: DERMATOLOGY

## 2025-05-01 PROCEDURE — 11302 SHAVE SKIN LESION 1.1-2.0 CM: CPT | Performed by: DERMATOLOGY

## 2025-05-01 PROCEDURE — 99214 OFFICE O/P EST MOD 30 MIN: CPT | Performed by: DERMATOLOGY

## 2025-05-01 PROCEDURE — 17003 DESTRUCT PREMALG LES 2-14: CPT | Performed by: DERMATOLOGY

## 2025-05-01 PROCEDURE — 11311 SHAVE SKIN LESION 0.6-1.0 CM: CPT | Performed by: DERMATOLOGY

## 2025-05-01 RX ORDER — CLINDAMYCIN PHOSPHATE 10 UG/ML
LOTION TOPICAL 2 TIMES DAILY
Qty: 60 ML | Refills: 11 | Status: SHIPPED | OUTPATIENT
Start: 2025-05-01 | End: 2026-05-01

## 2025-05-01 NOTE — Clinical Note
History of basal cell carcinoma and actinic keratoses, family history of melanoma, and photodamage.  There is no evidence of recurrence at the site of the patient's recently treated BCC on her left anterior superior neck on exam today.  The signs and symptoms of skin cancer were reviewed and the patient was advised to practice sun protection and sun avoidance, use daily sunscreen, and perform regular self skin exams.  I will have the patient return to our office in 1 year, pending the above biopsy results, for routine follow-up and skin exam, and the patient was instructed to call our office should the patient notice any new, changing, symptomatic, or otherwise concerning skin lesions before then.  The patient expressed understanding and is in agreement with this plan.

## 2025-05-01 NOTE — PROGRESS NOTES
Subjective     Flower Guevara is a 44 y.o. female who presents for the following: Skin Check.  She notes a new pink bump on her left upper forehead, which appeared a few months ago and has increased in size.  She also notes recent pimple breakouts on her back and sometimes on her legs after shaving.  She denies any other new, changing, or concerning skin lesions since her last visit; no bleeding, itching, or burning lesions.      Review of Systems:  No other skin or systemic complaints other than what is documented elsewhere in the note.    The following portions of the chart were reviewed this encounter and updated as appropriate:       Skin Cancer History  Biopsy Log Book  No skin cancers from Specimen Tracking.    Additional History      Specialty Problems          Dermatology Problems    Hair loss    Rosacea    Skin lesion    Tinea cruris       Past Dermatologic / Past Relevant Medical History:    - history of BCC on left anterior superior neck diagnosed at initial visit on 9/27/23 s/p Mohs surgery by Dr. Gambino on 3/8/24  - AKs  - rosacea  - folliculitis  - keloid  - no h/o melanoma    Family History:    Father - metastatic melanoma    Social History:    The patient states she recently went on a cruise and did not get a sunburn    Allergies:  Hydromorphone    Current Medications / CAM's:  Current Medications[1]     Objective   Well appearing patient in no apparent distress; mood and affect are within normal limits.    A full examination was performed including scalp, face, eyes, ears, nose, lips, neck, chest, axillae, abdomen, back, bilateral upper extremities, and bilateral lower extremities. All findings within normal limits unless otherwise noted below.    Assessment/Plan   Skin Exam  1. NEOPLASM OF UNCERTAIN BEHAVIOR OF SKIN (2)  Left Lateral Upper Forehead  6 mm pink, shiny papule     Shave removal    Lesion length (cm):  0.6  Margin per side (cm):  0  Lesion diameter (cm):  0.6  Informed consent:  discussed and consent obtained    Timeout: patient name, date of birth, surgical site, and procedure verified    Procedure prep:  Patient was prepped and draped  Anesthesia: the lesion was anesthetized in a standard fashion    Anesthetic:  1% lidocaine w/ epinephrine 1-100,000 local infiltration  Instrument used: flexible razor blade    Hemostasis achieved with: aluminum chloride    Outcome: patient tolerated procedure well    Post-procedure details: sterile dressing applied and wound care instructions given    Dressing type: bandage and petrolatum      Staff Communication: Dermatology Local Anesthesia: 1 % Lidocaine / Epinephrine - Amount:0.5ml  Specimen 1 - Dermatopathology- DERM LAB  Differential Diagnosis: SK v BCC v SCCIS  Check Margins Yes/No?:    Comments:    Dermpath Lab: Routine Histopathology (formalin-fixed tissue)  Right Anterior Mid-Arm  1.2 cm dark brown pigmented, asymmetric patch with an asymmetric pigment network and irregular borders     Shave removal    Lesion length (cm):  1.2  Margin per side (cm):  0  Lesion diameter (cm):  1.2  Informed consent: discussed and consent obtained    Timeout: patient name, date of birth, surgical site, and procedure verified    Procedure prep:  Patient was prepped and draped  Anesthesia: the lesion was anesthetized in a standard fashion    Anesthetic:  1% lidocaine w/ epinephrine 1-100,000 local infiltration  Instrument used: flexible razor blade    Hemostasis achieved with: aluminum chloride    Outcome: patient tolerated procedure well    Post-procedure details: sterile dressing applied and wound care instructions given    Dressing type: bandage and petrolatum      Staff Communication: Dermatology Local Anesthesia: 1 % Lidocaine / Epinephrine - Amount:0.5ml  Specimen 2 - Dermatopathology- DERM LAB  Differential Diagnosis: lentigo v AMH  Check Margins Yes/No?:    Comments:    Dermpath Lab: Routine Histopathology (formalin-fixed tissue)  2. ACTINIC KERATOSIS  (2)  Head - Anterior (Face) (2)  Scattered on the patient's right forehead, there are 2 erythematous, gritty, scaly macules   Actinic Keratoses -scattered on right forehead.  The pre-cancerous nature of these lesions and treatment options were discussed with the patient today.  At this time, I recommend treatment with liquid nitrogen cryotherapy.  The patient expressed understanding, is in agreement with this plan, and wishes to proceed with cryotherapy today.  Destr of lesion - Head - Anterior (Face) (2)  Complexity: simple    Destruction method: cryotherapy    Informed consent: discussed and consent obtained    Lesion destroyed using liquid nitrogen: Yes    Cryotherapy cycles:  1  Outcome: patient tolerated procedure well with no complications    Post-procedure details: wound care instructions given    3. MELANOCYTIC NEVUS OF TRUNK  Generalized  Scattered on the patient's face, neck, trunk, and extremities, there are several small, round- to oval-shaped, brown-pigmented and pink-colored, symmetric, uniform-appearing macules and dome-shaped papules  Clinically benign- to slightly atypical-appearing nevi - the clinically benign- to slightly atypical-appearing nature of the patient's nevi was discussed with the patient today.  None of the patient's nevi meet threshold for biopsy today.  I emphasized the importance of performing monthly self-skin exams using the ABCDs of monitoring moles, which were reviewed with the patient today and an informational hand-out provided.  I also emphasized the importance of sun avoidance and sun protection with daily sunscreen use.  The patient expressed understanding and is in agreement with this plan.  4. SEBORRHEIC KERATOSIS  Generalized  Scattered on the patient's face, neck, trunk, and extremities, there are multiple tan- to light brown-colored, hyperkeratotic, stuck-on appearing papules of varying size and shape  Seborrheic Keratoses - the benign nature of these lesions was  discussed with the patient today and reassurance provided.  No treatment is medically indicated for these lesions at this time.  5. HEMANGIOMA OF SKIN  Generalized  Scattered on the patient's face, neck, trunk, and extremities, there are multiple small, round, cherry red- to purplish-colored, symmetric, uniform, vascular-appearing macules and papules  Cherry Angiomas - the benign nature of these vascular lesions was discussed with the patient today and reassurance provided.  No treatment is medically indicated for these lesions at this time.  6. FOLLICULITIS  Right Lower Leg - Anterior  Scattered on the patient's lower back and bilateral legs, there are several follicular-based erythematous, inflammatory papules and pustules  Folliculitis -flare on lower back and bilateral legs.  The bacterial nature of this condition and treatment options were discussed with the patient today.  At this time, I recommend topical antibiotic therapy with Clindamycin 1% lotion, which the patient was instructed to apply twice daily to the affected areas or up to 3-4 times per day as needed for active lesions.  The risks, benefits, and side effects of this medication were discussed.  The patient expressed understanding and is in agreement with this plan.  clindamycin (Cleocin T) 1 % lotion - Right Lower Leg - Anterior  Apply topically 2 times a day.  7. HISTORY OF NONMELANOMA SKIN CANCER  Generalized  On the patient's left anterior superior neck, there is a well-healed scar with no evidence of recurrent growth on exam today.  History of basal cell carcinoma and actinic keratoses, family history of melanoma, and photodamage.  There is no evidence of recurrence at the site of the patient's recently treated BCC on her left anterior superior neck on exam today.  The signs and symptoms of skin cancer were reviewed and the patient was advised to practice sun protection and sun avoidance, use daily sunscreen, and perform regular self skin  exams.  I will have the patient return to our office in 1 year, pending the above biopsy results, for routine follow-up and skin exam, and the patient was instructed to call our office should the patient notice any new, changing, symptomatic, or otherwise concerning skin lesions before then.  The patient expressed understanding and is in agreement with this plan.  8. DIFFUSE PHOTODAMAGE OF SKIN  Photodistributed  Diffuse photodamage with actinic changes with telangiectasia and mottled pigmentation in sun-exposed areas.  Photodamage.  The signs and symptoms of skin cancer were reviewed and the patient was advised to practice sun protection and sun avoidance, use daily sunscreen, and perform regular self skin exams.  Sun protection was discussed, including avoiding the mid-day sun, wearing a sunscreen with SPF at least 50, and stressing the need for reapplication of sunscreen and applying more than they think they need.          [1]   Current Outpatient Medications:     aspirin-acetaminophen-caffeine (Excedrin Extra Strength) 250-250-65 mg tablet, Take by mouth every 6 hours if needed., Disp: , Rfl:     cholecalciferol (Vitamin D-3) 50 mcg (2,000 unit) capsule, Take 1 capsule (50 mcg) by mouth once daily., Disp: , Rfl:     clonazePAM (KlonoPIN) 0.5 mg tablet, Take 1 tablet (0.5 mg) by mouth 3 times a day as needed., Disp: , Rfl:     COVID-19,flu A,B nucleic assay kit, 1 Units once daily as needed (as needed for illness)., Disp: 1 kit, Rfl: 0    Daily-Filipe tablet, Take 1 tablet by mouth early in the morning.., Disp: , Rfl:     dicyclomine (Bentyl) 10 mg capsule, Take 1 capsule (10 mg) by mouth 4 times a day before meals. Take with meals and at bedtime, Disp: 120 capsule, Rfl: 11    DULoxetine (Cymbalta) 30 mg DR capsule, Take 1 capsule (30 mg) by mouth once daily in the morning., Disp: , Rfl:     estradiol (Vagifem) 10 mcg tablet vaginal tablet, Insert 1 tablet (10 mcg) into the vagina 2 times a week., Disp: 8 tablet,  Rfl: 11    fluconazole (Diflucan) 150 mg tablet, Take one dose weekly for long term suppression therapy of recurrent yeast, Disp: 16 tablet, Rfl: 1    hydrocortisone (Proctosol HC) 2.5 % rectal cream, Insert into the rectum 2 times a day., Disp: 28 g, Rfl: 3    hydrOXYzine pamoate (Vistaril) 25 mg capsule, Take 1 capsule (25 mg) by mouth every 12 hours., Disp: , Rfl:     ibuprofen 800 mg tablet, TAKE 1 TABLET (800 MG) BY MOUTH 1 TIME IF NEEDED (WITH SUMATRIPTAN AT ONSET OF MIGRAINE)., Disp: 30 tablet, Rfl: 5    L. acidophilus/Bifid. animalis 32 billion cell capsule, Take by mouth., Disp: , Rfl:     lamoTRIgine (LaMICtal) 150 mg tablet, TAKE 2 TABLETS BY MOUTH AT BEDTIME. TOTAL DOSE 300MG, Disp: , Rfl:     levothyroxine (Synthroid, Levoxyl) 75 mcg tablet, Take 1 tablet (75 mcg) by mouth once daily., Disp: 90 tablet, Rfl: 0    lidocaine (Xylocaine) 5 % ointment, Apply topically 2 times a day as needed for mild pain (1 - 3) (apply to rectum twice daily as needed)., Disp: 30 g, Rfl: 1    linaCLOtide (Linzess) 72 mcg capsule, Take 1 capsule (72 mcg) by mouth once daily in the morning. Take before meals. Do not crush or chew., Disp: 30 capsule, Rfl: 11    lurasidone (Latuda) 40 mg tablet, TAKE 1 TABLET BY MOUTH IN THE EVENING WITH FOOD, Disp: , Rfl:     melatonin 10 mg capsule, Take by mouth., Disp: , Rfl:     multivit-mineral-iron-lutein tablet, Take 1 tablet by mouth once daily., Disp: , Rfl:     ondansetron ODT (Zofran-ODT) 8 mg disintegrating tablet, Dissolve 1 tablet (8 mg) in the mouth every 8 hours if needed for nausea or vomiting., Disp: 30 tablet, Rfl: 2    pramipexole (Mirapex) 0.125 mg tablet, Take 1 tablet once daily 2-3 hours before bedtime, Disp: 90 tablet, Rfl: 3    progesterone (Prometrium) 100 mg capsule, TAKE 1 CAPSULE (100 MG) BY MOUTH ONCE DAILY. TAKE AT BEDTIME, Disp: 90 capsule, Rfl: 3    SUMAtriptan (Imitrex) 50 mg tablet, Take 1 tablet (50 mg) by mouth 1 time if needed (at onset of migraine, may  repeat in 2 hours if no relief, max of 3 in one day.)., Disp: 9 tablet, Rfl: 11    tiZANidine (Zanaflex) 4 mg tablet, TAKE 1 TABLET (4 MG) BY MOUTH ONCE DAILY AT BEDTIME., Disp: 30 tablet, Rfl: 5    topiramate (Topamax) 100 mg tablet, Take 2 tablets (200 mg) by mouth once daily at bedtime., Disp: 180 tablet, Rfl: 3    valACYclovir (Valtrex) 500 mg tablet, TAKE 1 TABLET BY MOUTH ONCE DAILY. DURING OUTBREAKS, TAKE 1 TABLET TWICE DAILY FOR 3 DAYS, Disp: 90 tablet, Rfl: 1    zolpidem (Ambien) 10 mg tablet, Take 1 tablet (10 mg) by mouth as needed at bedtime., Disp: , Rfl:     clindamycin (Cleocin T) 1 % lotion, Apply topically 2 times a day., Disp: 60 mL, Rfl: 11

## 2025-05-01 NOTE — Clinical Note
Metz Angiomas - the benign nature of these vascular lesions was discussed with the patient today and reassurance provided.  No treatment is medically indicated for these lesions at this time.

## 2025-05-01 NOTE — Clinical Note
Scattered on the patient's lower back and bilateral legs, there are several follicular-based erythematous, inflammatory papules and pustules

## 2025-05-01 NOTE — Clinical Note
On the patient's left anterior superior neck, there is a well-healed scar with no evidence of recurrent growth on exam today.

## 2025-05-01 NOTE — Clinical Note
1.2 cm dark brown pigmented, asymmetric patch with an asymmetric pigment network and irregular borders

## 2025-05-01 NOTE — Clinical Note
Actinic Keratoses -scattered on right forehead.  The pre-cancerous nature of these lesions and treatment options were discussed with the patient today.  At this time, I recommend treatment with liquid nitrogen cryotherapy.  The patient expressed understanding, is in agreement with this plan, and wishes to proceed with cryotherapy today.

## 2025-05-01 NOTE — Clinical Note
Folliculitis -flare on lower back and bilateral legs.  The bacterial nature of this condition and treatment options were discussed with the patient today.  At this time, I recommend topical antibiotic therapy with Clindamycin 1% lotion, which the patient was instructed to apply twice daily to the affected areas or up to 3-4 times per day as needed for active lesions.  The risks, benefits, and side effects of this medication were discussed.  The patient expressed understanding and is in agreement with this plan.

## 2025-05-05 DIAGNOSIS — Z12.11 SPECIAL SCREENING FOR MALIGNANT NEOPLASMS, COLON: ICD-10-CM

## 2025-05-05 RX ORDER — POLYETHYLENE GLYCOL 3350, SODIUM SULFATE ANHYDROUS, SODIUM BICARBONATE, SODIUM CHLORIDE, POTASSIUM CHLORIDE 236; 22.74; 6.74; 5.86; 2.97 G/4L; G/4L; G/4L; G/4L; G/4L
4 POWDER, FOR SOLUTION ORAL ONCE
Qty: 4000 ML | Refills: 0 | Status: SHIPPED | OUTPATIENT
Start: 2025-05-05 | End: 2025-05-05

## 2025-05-09 ENCOUNTER — APPOINTMENT (OUTPATIENT)
Dept: PRIMARY CARE | Facility: CLINIC | Age: 45
End: 2025-05-09
Payer: COMMERCIAL

## 2025-05-16 SDOH — ECONOMIC STABILITY: FOOD INSECURITY: WITHIN THE PAST 12 MONTHS, THE FOOD YOU BOUGHT JUST DIDN'T LAST AND YOU DIDN'T HAVE MONEY TO GET MORE.: NEVER TRUE

## 2025-05-16 SDOH — SOCIAL STABILITY: SOCIAL NETWORK

## 2025-05-16 SDOH — SOCIAL STABILITY: SOCIAL NETWORK: I HAVE TROUBLE DOING ALL OF THE FAMILY ACTIVITIES THAT I WANT TO DO: SOMETIMES

## 2025-05-16 SDOH — ECONOMIC STABILITY: FOOD INSECURITY: WITHIN THE PAST 12 MONTHS, YOU WORRIED THAT YOUR FOOD WOULD RUN OUT BEFORE YOU GOT MONEY TO BUY MORE.: SOMETIMES TRUE

## 2025-05-16 SDOH — SOCIAL STABILITY: SOCIAL NETWORK: I HAVE TROUBLE DOING ALL OF MY REGULAR LEISURE ACTIVITIES WITH OTHERS: SOMETIMES

## 2025-05-16 SDOH — SOCIAL STABILITY: SOCIAL NETWORK: I HAVE TROUBLE DOING ALL OF MY USUAL WORK (INCLUDE WORK AT HOME): SOMETIMES

## 2025-05-16 SDOH — SOCIAL STABILITY: SOCIAL NETWORK: PROMIS ABILITY TO PARTICIPATE IN SOCIAL ROLES & ACTIVITIES T-SCORE: 45

## 2025-05-16 SDOH — SOCIAL STABILITY: SOCIAL NETWORK: I HAVE TROUBLE DOING ALL OF THE ACTIVITIES WITH FRIENDS THAT I WANT TO DO: SOMETIMES

## 2025-05-16 ASSESSMENT — ANXIETY QUESTIONNAIRES
PAST MONTH HOW OFTEN HAVE YOU FELT DIFFICULTIES WERE PILING UP SO HIGH THAT YOU COULD NOT OVERCOME THEM: 3 - FAIRLY OFTEN
PAST MONTH HOW OFTEN HAVE YOU FELT THAT YOU WERE UNABLE TO CONTROL THE IMPORTANT THINGS IN YOUR LIFE: 3 - FAIRLY OFTEN
PAST MONTH HOW OFTEN HAVE YOU FELT THAT THINGS WERE GOING YOUR WAY: 2 - SOMETIMES
PAST MONTH HOW OFTEN HAVE YOU FELT CONFIDENT ABOUT YOUR ABILITY TO HANDLE YOUR PROBLEMS: 2 - SOMETIMES
PAST MONTH HOW OFTEN HAVE YOU FELT THAT YOU WERE UNABLE TO CONTROL THE IMPORTANT THINGS IN YOUR LIFE: 3 - FAIRLY OFTEN
PAST MONTH HOW OFTEN HAVE YOU FELT CONFIDENT ABOUT YOUR ABILITY TO HANDLE YOUR PROBLEMS: 2 - SOMETIMES

## 2025-05-16 ASSESSMENT — PROMIS GLOBAL HEALTH SCALE
RATE_MENTAL_HEALTH: POOR
EMOTIONAL_PROBLEMS: OFTEN
CARRYOUT_SOCIAL_ACTIVITIES: FAIR
RATE_AVERAGE_PAIN: 7
RATE_PHYSICAL_HEALTH: FAIR
RATE_SOCIAL_SATISFACTION: FAIR
RATE_QUALITY_OF_LIFE: FAIR
CARRYOUT_PHYSICAL_ACTIVITIES: COMPLETELY
RATE_GENERAL_HEALTH: FAIR
RATE_AVERAGE_FATIGUE: SEVERE

## 2025-05-20 ENCOUNTER — APPOINTMENT (OUTPATIENT)
Dept: INTEGRATIVE MEDICINE | Facility: CLINIC | Age: 45
End: 2025-05-20
Payer: COMMERCIAL

## 2025-05-22 ENCOUNTER — TELEPHONE (OUTPATIENT)
Dept: NEUROLOGY | Facility: CLINIC | Age: 45
End: 2025-05-22
Payer: COMMERCIAL

## 2025-05-22 DIAGNOSIS — G43.711 INTRACTABLE CHRONIC MIGRAINE WITHOUT AURA AND WITH STATUS MIGRAINOSUS: Primary | ICD-10-CM

## 2025-05-30 RX ORDER — DEXAMETHASONE 2 MG/1
TABLET ORAL
Qty: 15 TABLET | Refills: 0 | Status: SHIPPED | OUTPATIENT
Start: 2025-05-30

## 2025-05-30 NOTE — TELEPHONE ENCOUNTER
Pt had dexamethasone order I'd sent in for her before, she used it, and headache is better.  I wrote a new dexamethasone prescription for just in case of intractable headache.

## 2025-06-06 ENCOUNTER — APPOINTMENT (OUTPATIENT)
Dept: GASTROENTEROLOGY | Facility: EXTERNAL LOCATION | Age: 45
End: 2025-06-06
Payer: COMMERCIAL

## 2025-06-06 DIAGNOSIS — D12.8 BENIGN NEOPLASM OF RECTUM AND ANAL CANAL: ICD-10-CM

## 2025-06-06 DIAGNOSIS — Z12.11 COLON CANCER SCREENING: Primary | ICD-10-CM

## 2025-06-06 DIAGNOSIS — D12.9 BENIGN NEOPLASM OF RECTUM AND ANAL CANAL: ICD-10-CM

## 2025-06-06 DIAGNOSIS — Z12.11 COLON CANCER SCREENING: ICD-10-CM

## 2025-06-06 PROCEDURE — 45380 COLONOSCOPY AND BIOPSY: CPT | Performed by: INTERNAL MEDICINE

## 2025-06-06 PROCEDURE — 88305 TISSUE EXAM BY PATHOLOGIST: CPT | Performed by: PATHOLOGY

## 2025-06-06 PROCEDURE — 88305 TISSUE EXAM BY PATHOLOGIST: CPT

## 2025-06-07 ENCOUNTER — LAB REQUISITION (OUTPATIENT)
Dept: LAB | Facility: HOSPITAL | Age: 45
End: 2025-06-07
Payer: COMMERCIAL

## 2025-06-09 ENCOUNTER — APPOINTMENT (OUTPATIENT)
Dept: PRIMARY CARE | Facility: CLINIC | Age: 45
End: 2025-06-09
Payer: COMMERCIAL

## 2025-06-19 LAB
LABORATORY COMMENT REPORT: NORMAL
PATH REPORT.FINAL DX SPEC: NORMAL
PATH REPORT.GROSS SPEC: NORMAL
PATH REPORT.RELEVANT HX SPEC: NORMAL
PATH REPORT.TOTAL CANCER: NORMAL

## 2025-06-24 ENCOUNTER — OFFICE VISIT (OUTPATIENT)
Dept: PRIMARY CARE | Facility: CLINIC | Age: 45
End: 2025-06-24
Payer: COMMERCIAL

## 2025-06-24 VITALS
BODY MASS INDEX: 31.22 KG/M2 | HEART RATE: 101 BPM | HEIGHT: 68 IN | WEIGHT: 206 LBS | SYSTOLIC BLOOD PRESSURE: 116 MMHG | DIASTOLIC BLOOD PRESSURE: 87 MMHG

## 2025-06-24 DIAGNOSIS — E78.2 MIXED HYPERLIPIDEMIA: ICD-10-CM

## 2025-06-24 DIAGNOSIS — E55.9 VITAMIN D DEFICIENCY: ICD-10-CM

## 2025-06-24 DIAGNOSIS — R53.83 FATIGUE, UNSPECIFIED TYPE: ICD-10-CM

## 2025-06-24 DIAGNOSIS — M54.2 NECK PAIN: Primary | ICD-10-CM

## 2025-06-24 DIAGNOSIS — B37.9 YEAST INFECTION: ICD-10-CM

## 2025-06-24 DIAGNOSIS — N64.4 BREAST TENDERNESS: ICD-10-CM

## 2025-06-24 DIAGNOSIS — E03.9 HYPOTHYROIDISM, UNSPECIFIED TYPE: ICD-10-CM

## 2025-06-24 DIAGNOSIS — R73.9 HYPERGLYCEMIA: ICD-10-CM

## 2025-06-24 PROCEDURE — 99214 OFFICE O/P EST MOD 30 MIN: CPT

## 2025-06-24 PROCEDURE — 3008F BODY MASS INDEX DOCD: CPT

## 2025-06-24 RX ORDER — DOXYLAMINE SUCCINATE 25 MG
TABLET ORAL DAILY
Qty: 15 G | Refills: 0 | Status: SHIPPED | OUTPATIENT
Start: 2025-06-24 | End: 2025-06-27

## 2025-06-24 ASSESSMENT — ENCOUNTER SYMPTOMS
OCCASIONAL FEELINGS OF UNSTEADINESS: 0
NECK PAIN: 1
LOSS OF SENSATION IN FEET: 0
FEVER: 0
DEPRESSION: 0
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
## 2025-06-24 NOTE — ASSESSMENT & PLAN NOTE
-last TSH normal 7-18-24  -well controlled, continue current treatment  -levothyroxine 75mcg daily   Orders:    Tsh With Reflex To Free T4 If Abnormal; Future

## 2025-06-24 NOTE — ASSESSMENT & PLAN NOTE
-last lipid panel elevated triglycerides 6-6-24  -continue a diet high in omega 3's, avoid sugars and carbs, fried foods  Orders:    Comprehensive metabolic panel; Future    Lipid panel; Future

## 2025-06-24 NOTE — ASSESSMENT & PLAN NOTE
-last vit D normal 6-6-24  -well controlled, cont current treatment  -vit d3 2,000 units daily  Orders:    Vitamin D 25-Hydroxy,Total (for eval of Vitamin D levels); Future

## 2025-06-24 NOTE — ASSESSMENT & PLAN NOTE
-feels anxiety and depression is well controlled, sees other DrElver For meds and feels dosages are appropriate  -has felt fatigued for awhile now  -encouraged sleep hygiene routine  Orders:    CBC and Auto Differential; Future    Vitamin B12; Future    Ferritin; Future

## 2025-06-26 LAB
25(OH)D3+25(OH)D2 SERPL-MCNC: 32 NG/ML (ref 30–100)
ALBUMIN SERPL-MCNC: 4.1 G/DL (ref 3.6–5.1)
ALP SERPL-CCNC: 92 U/L (ref 31–125)
ALT SERPL-CCNC: 10 U/L (ref 6–29)
ANION GAP SERPL CALCULATED.4IONS-SCNC: 10 MMOL/L (CALC) (ref 7–17)
AST SERPL-CCNC: 11 U/L (ref 10–35)
BASOPHILS # BLD AUTO: 72 CELLS/UL (ref 0–200)
BASOPHILS NFR BLD AUTO: 1 %
BILIRUB SERPL-MCNC: 0.4 MG/DL (ref 0.2–1.2)
BUN SERPL-MCNC: 15 MG/DL (ref 7–25)
CALCIUM SERPL-MCNC: 9 MG/DL (ref 8.6–10.2)
CHLORIDE SERPL-SCNC: 106 MMOL/L (ref 98–110)
CHOLEST SERPL-MCNC: 167 MG/DL
CHOLEST/HDLC SERPL: 2.7 (CALC)
CO2 SERPL-SCNC: 25 MMOL/L (ref 20–32)
CREAT SERPL-MCNC: 0.62 MG/DL (ref 0.5–0.99)
EGFRCR SERPLBLD CKD-EPI 2021: 112 ML/MIN/1.73M2
EOSINOPHIL # BLD AUTO: 259 CELLS/UL (ref 15–500)
EOSINOPHIL NFR BLD AUTO: 3.6 %
ERYTHROCYTE [DISTWIDTH] IN BLOOD BY AUTOMATED COUNT: 13.8 % (ref 11–15)
EST. AVERAGE GLUCOSE BLD GHB EST-MCNC: 103 MG/DL
EST. AVERAGE GLUCOSE BLD GHB EST-SCNC: 5.7 MMOL/L
FERRITIN SERPL-MCNC: 33 NG/ML (ref 16–232)
GLUCOSE SERPL-MCNC: 87 MG/DL (ref 65–99)
HBA1C MFR BLD: 5.2 %
HCT VFR BLD AUTO: 45.6 % (ref 35–45)
HDLC SERPL-MCNC: 63 MG/DL
HGB BLD-MCNC: 14.5 G/DL (ref 11.7–15.5)
LDLC SERPL CALC-MCNC: 79 MG/DL (CALC)
LYMPHOCYTES # BLD AUTO: 1908 CELLS/UL (ref 850–3900)
LYMPHOCYTES NFR BLD AUTO: 26.5 %
MCH RBC QN AUTO: 33.4 PG (ref 27–33)
MCHC RBC AUTO-ENTMCNC: 31.8 G/DL (ref 32–36)
MCV RBC AUTO: 105.1 FL (ref 80–100)
MONOCYTES # BLD AUTO: 454 CELLS/UL (ref 200–950)
MONOCYTES NFR BLD AUTO: 6.3 %
NEUTROPHILS # BLD AUTO: 4507 CELLS/UL (ref 1500–7800)
NEUTROPHILS NFR BLD AUTO: 62.6 %
NONHDLC SERPL-MCNC: 104 MG/DL (CALC)
PLATELET # BLD AUTO: 317 THOUSAND/UL (ref 140–400)
PMV BLD REES-ECKER: 9.7 FL (ref 7.5–12.5)
POTASSIUM SERPL-SCNC: 4.2 MMOL/L (ref 3.5–5.3)
PROT SERPL-MCNC: 6.8 G/DL (ref 6.1–8.1)
RBC # BLD AUTO: 4.34 MILLION/UL (ref 3.8–5.1)
SODIUM SERPL-SCNC: 141 MMOL/L (ref 135–146)
TRIGL SERPL-MCNC: 148 MG/DL
TSH SERPL-ACNC: 0.62 MIU/L
VIT B12 SERPL-MCNC: 366 PG/ML (ref 200–1100)
WBC # BLD AUTO: 7.2 THOUSAND/UL (ref 3.8–10.8)

## 2025-06-30 ENCOUNTER — APPOINTMENT (OUTPATIENT)
Dept: RADIOLOGY | Facility: CLINIC | Age: 45
End: 2025-06-30
Payer: COMMERCIAL

## 2025-07-02 DIAGNOSIS — M54.2 CERVICALGIA: ICD-10-CM

## 2025-07-02 RX ORDER — TIZANIDINE 4 MG/1
4 TABLET ORAL NIGHTLY
Qty: 30 TABLET | Refills: 5 | Status: SHIPPED | OUTPATIENT
Start: 2025-07-02

## 2025-07-03 ENCOUNTER — APPOINTMENT (OUTPATIENT)
Dept: PRIMARY CARE | Facility: CLINIC | Age: 45
End: 2025-07-03
Payer: COMMERCIAL

## 2025-07-10 ENCOUNTER — APPOINTMENT (OUTPATIENT)
Dept: RADIOLOGY | Facility: CLINIC | Age: 45
End: 2025-07-10
Payer: COMMERCIAL

## 2025-07-10 VITALS — WEIGHT: 205.91 LBS | HEIGHT: 68 IN | BODY MASS INDEX: 31.21 KG/M2

## 2025-07-10 DIAGNOSIS — Z12.31 SCREENING MAMMOGRAM FOR BREAST CANCER: ICD-10-CM

## 2025-07-10 PROCEDURE — 77067 SCR MAMMO BI INCL CAD: CPT

## 2025-07-10 PROCEDURE — 77063 BREAST TOMOSYNTHESIS BI: CPT | Performed by: RADIOLOGY

## 2025-07-10 PROCEDURE — 77067 SCR MAMMO BI INCL CAD: CPT | Performed by: RADIOLOGY

## 2025-07-14 ENCOUNTER — APPOINTMENT (OUTPATIENT)
Facility: CLINIC | Age: 45
End: 2025-07-14
Payer: COMMERCIAL

## 2025-07-14 DIAGNOSIS — M54.2 NECK PAIN: ICD-10-CM

## 2025-07-14 PROCEDURE — 99203 OFFICE O/P NEW LOW 30 MIN: CPT | Performed by: PHYSICIAN ASSISTANT

## 2025-07-14 ASSESSMENT — PAIN - FUNCTIONAL ASSESSMENT: PAIN_FUNCTIONAL_ASSESSMENT: 0-10

## 2025-07-14 ASSESSMENT — PAIN SCALES - GENERAL: PAINLEVEL_OUTOF10: 8
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
## 2025-08-05 ENCOUNTER — APPOINTMENT (OUTPATIENT)
Dept: OBSTETRICS AND GYNECOLOGY | Facility: CLINIC | Age: 45
End: 2025-08-05
Payer: COMMERCIAL

## 2025-08-05 VITALS
BODY MASS INDEX: 34.44 KG/M2 | WEIGHT: 219.4 LBS | DIASTOLIC BLOOD PRESSURE: 70 MMHG | SYSTOLIC BLOOD PRESSURE: 112 MMHG | HEIGHT: 67 IN

## 2025-08-05 DIAGNOSIS — F31.81 BIPOLAR II DISORDER (MULTI): ICD-10-CM

## 2025-08-05 DIAGNOSIS — E21.3 HYPERPARATHYROIDISM (MULTI): ICD-10-CM

## 2025-08-05 DIAGNOSIS — F33.2 MDD (MAJOR DEPRESSIVE DISORDER), RECURRENT SEVERE, WITHOUT PSYCHOSIS (MULTI): ICD-10-CM

## 2025-08-05 DIAGNOSIS — T14.91XA SUICIDE ATTEMPT (MULTI): ICD-10-CM

## 2025-08-05 DIAGNOSIS — Z79.890 MENOPAUSAL SYNDROME ON HORMONE REPLACEMENT THERAPY: ICD-10-CM

## 2025-08-05 DIAGNOSIS — N76.0 RECURRENT VAGINITIS: ICD-10-CM

## 2025-08-05 DIAGNOSIS — N95.1 MENOPAUSAL SYNDROME ON HORMONE REPLACEMENT THERAPY: ICD-10-CM

## 2025-08-05 DIAGNOSIS — F31.9 BIPOLAR DISORDER WITH DEPRESSION (MULTI): ICD-10-CM

## 2025-08-05 DIAGNOSIS — N95.8 GENITOURINARY SYNDROME OF MENOPAUSE: Primary | ICD-10-CM

## 2025-08-05 DIAGNOSIS — N95.2 VAGINAL ATROPHY: ICD-10-CM

## 2025-08-05 PROCEDURE — 1036F TOBACCO NON-USER: CPT | Performed by: NURSE PRACTITIONER

## 2025-08-05 PROCEDURE — 99213 OFFICE O/P EST LOW 20 MIN: CPT | Performed by: NURSE PRACTITIONER

## 2025-08-05 PROCEDURE — 3008F BODY MASS INDEX DOCD: CPT | Performed by: NURSE PRACTITIONER

## 2025-08-05 RX ORDER — PLECANATIDE 3 MG/1
1 TABLET ORAL
COMMUNITY
Start: 2025-07-03

## 2025-08-05 RX ORDER — FLUCONAZOLE 150 MG/1
TABLET ORAL
Qty: 4 TABLET | Refills: 7 | Status: SHIPPED | OUTPATIENT
Start: 2025-08-05

## 2025-08-05 ASSESSMENT — ENCOUNTER SYMPTOMS
NEUROLOGICAL NEGATIVE: 0
EYES NEGATIVE: 0
MUSCULOSKELETAL NEGATIVE: 0
CARDIOVASCULAR NEGATIVE: 0
ALLERGIC/IMMUNOLOGIC NEGATIVE: 0
ENDOCRINE NEGATIVE: 0
RESPIRATORY NEGATIVE: 0
CONSTITUTIONAL NEGATIVE: 0
HEMATOLOGIC/LYMPHATIC NEGATIVE: 0
PSYCHIATRIC NEGATIVE: 0
GASTROINTESTINAL NEGATIVE: 0

## 2025-08-05 ASSESSMENT — PAIN SCALES - GENERAL: PAINLEVEL_OUTOF10: 0-NO PAIN

## 2025-08-05 NOTE — PROGRESS NOTES
Subjective   Patient ID: Flower Guevara is a 45 y.o. female who presents for Vaginitis/Bacterial Vaginosis.  HPI  H/o recurrent yeast infections, on long term suppression therapy, if she misses one dose she has a reoccurrence of symptoms   Concern: vaginal dryness is still persistent and refill of prescription.     Vaginal dryness all the time; can't lubricate by self   Decreased libido    Vaginal discharge with yeast infections.   Breast tenderness and cramping sometimes when having sex.     Medical history: hysterectomy at 31 years old for endometriosis    VMS: last few years; night sweats every other night  Brain fog: consistent  Urine incontinence: none   Orgasm: really difficult     Treatment that she has tried:  Estring: effective but partner could feel it  Vagifem: ineffective  Vaginal estradiol cream: too messy  Addyi: AE  Vyleesi: not covered by insurance   Estradiol patch: CD    Testosterone level: 2024 was elevated      Review of Systems  Painful intercourse because of dryness. Uses lubrication, but isn't enjoyable.    Objective   Physical Exam    Assessment/Plan   Diagnoses and all orders for this visit:  Genitourinary syndrome of menopause  Vaginal atrophy  -     estradioL 10 mcg insert; Insert 10 mcg into the vagina 2 times a week.  Menopausal syndrome on hormone replacement therapy  -     Testosterone,Free and Total; Future  Recurrent vaginitis  -     fluconazole (Diflucan) 150 mg tablet; Take one dose weekly for long term suppression therapy of recurrent yeast    GSM: will try Imvexxy, sample given  Sample of Larisa Jo, and Barbie  Will contact her with result of testosterone and discuss it for HSDD       WINIFRED Nye-CNP 08/05/25 4:08 PM

## 2025-08-09 LAB
TESTOST FREE SERPL-MCNC: 4.4 PG/ML (ref 0.1–6.4)
TESTOST SERPL-MCNC: 39 NG/DL (ref 2–45)

## 2025-08-11 DIAGNOSIS — N95.1 MENOPAUSAL SYNDROME ON HORMONE REPLACEMENT THERAPY: ICD-10-CM

## 2025-08-11 DIAGNOSIS — F52.0 HYPOACTIVE SEXUAL DESIRE DISORDER: Primary | ICD-10-CM

## 2025-08-11 DIAGNOSIS — Z79.890 MENOPAUSAL SYNDROME ON HORMONE REPLACEMENT THERAPY: ICD-10-CM

## 2025-08-11 RX ORDER — TESTOSTERONE 20.25 MG/1.25G
GEL TOPICAL
Qty: 75 G | Refills: 0 | Status: SHIPPED | OUTPATIENT
Start: 2025-08-11

## 2025-08-18 ENCOUNTER — APPOINTMENT (OUTPATIENT)
Dept: PRIMARY CARE | Facility: CLINIC | Age: 45
End: 2025-08-18
Payer: COMMERCIAL

## 2025-08-22 ASSESSMENT — PROMIS GLOBAL HEALTH SCALE
RATE_QUALITY_OF_LIFE: FAIR
RATE_AVERAGE_FATIGUE: VERY SEVERE
CARRYOUT_PHYSICAL_ACTIVITIES: MOSTLY
CARRYOUT_SOCIAL_ACTIVITIES: FAIR
EMOTIONAL_PROBLEMS: OFTEN
RATE_GENERAL_HEALTH: FAIR
RATE_PHYSICAL_HEALTH: FAIR
RATE_MENTAL_HEALTH: POOR
RATE_AVERAGE_PAIN: 7
RATE_SOCIAL_SATISFACTION: GOOD

## 2025-08-25 ENCOUNTER — APPOINTMENT (OUTPATIENT)
Dept: PRIMARY CARE | Facility: CLINIC | Age: 45
End: 2025-08-25
Payer: COMMERCIAL

## 2025-08-25 VITALS
SYSTOLIC BLOOD PRESSURE: 115 MMHG | DIASTOLIC BLOOD PRESSURE: 80 MMHG | HEIGHT: 67 IN | BODY MASS INDEX: 34.06 KG/M2 | HEART RATE: 105 BPM | WEIGHT: 217 LBS

## 2025-08-25 DIAGNOSIS — G43.809 OTHER MIGRAINE WITHOUT STATUS MIGRAINOSUS, NOT INTRACTABLE: ICD-10-CM

## 2025-08-25 DIAGNOSIS — B00.9 HSV-2 (HERPES SIMPLEX VIRUS 2) INFECTION: ICD-10-CM

## 2025-08-25 DIAGNOSIS — G89.29 NECK PAIN, CHRONIC: ICD-10-CM

## 2025-08-25 DIAGNOSIS — Z00.00 ANNUAL PHYSICAL EXAM: Primary | ICD-10-CM

## 2025-08-25 DIAGNOSIS — E03.9 HYPOTHYROIDISM, UNSPECIFIED TYPE: ICD-10-CM

## 2025-08-25 DIAGNOSIS — M54.2 NECK PAIN, CHRONIC: ICD-10-CM

## 2025-08-25 DIAGNOSIS — Z11.4 SCREENING FOR HUMAN IMMUNODEFICIENCY VIRUS: ICD-10-CM

## 2025-08-25 DIAGNOSIS — Z51.81 MEDICATION MONITORING ENCOUNTER: ICD-10-CM

## 2025-08-25 DIAGNOSIS — Z11.3 SCREEN FOR STD (SEXUALLY TRANSMITTED DISEASE): ICD-10-CM

## 2025-08-25 PROCEDURE — 3008F BODY MASS INDEX DOCD: CPT

## 2025-08-25 PROCEDURE — 93000 ELECTROCARDIOGRAM COMPLETE: CPT

## 2025-08-25 PROCEDURE — 1036F TOBACCO NON-USER: CPT

## 2025-08-25 PROCEDURE — 99396 PREV VISIT EST AGE 40-64: CPT

## 2025-08-25 RX ORDER — LEVOTHYROXINE SODIUM 75 UG/1
75 TABLET ORAL DAILY
Qty: 90 TABLET | Refills: 0 | Status: SHIPPED | OUTPATIENT
Start: 2025-08-25

## 2025-08-25 RX ORDER — VALACYCLOVIR HYDROCHLORIDE 500 MG/1
TABLET, FILM COATED ORAL
Qty: 90 TABLET | Refills: 1 | Status: SHIPPED | OUTPATIENT
Start: 2025-08-25

## 2025-08-25 RX ORDER — ONDANSETRON 8 MG/1
8 TABLET, ORALLY DISINTEGRATING ORAL EVERY 8 HOURS PRN
Qty: 30 TABLET | Refills: 2 | Status: SHIPPED | OUTPATIENT
Start: 2025-08-25

## 2025-08-25 ASSESSMENT — ENCOUNTER SYMPTOMS
OCCASIONAL FEELINGS OF UNSTEADINESS: 0
JOINT SWELLING: 0
LOSS OF SENSATION IN FEET: 0
COLOR CHANGE: 0
FEVER: 0
BACK PAIN: 1
DEPRESSION: 0
POLYPHAGIA: 0
BLOOD IN STOOL: 0
HEADACHES: 1
FLANK PAIN: 0
PALPITATIONS: 0
ACTIVITY CHANGE: 0
SLEEP DISTURBANCE: 1
DIARRHEA: 0
WOUND: 0
NERVOUS/ANXIOUS: 1
APPETITE CHANGE: 1
NAUSEA: 0
HEMATURIA: 0
SHORTNESS OF BREATH: 0
CONSTIPATION: 1
NECK PAIN: 1
SORE THROAT: 0
POLYDIPSIA: 0
DYSURIA: 0

## 2025-08-25 ASSESSMENT — PATIENT HEALTH QUESTIONNAIRE - PHQ9
2. FEELING DOWN, DEPRESSED OR HOPELESS: NOT AT ALL
SUM OF ALL RESPONSES TO PHQ9 QUESTIONS 1 AND 2: 0
1. LITTLE INTEREST OR PLEASURE IN DOING THINGS: NOT AT ALL

## 2025-08-29 LAB
C TRACH RRNA SPEC QL NAA+PROBE: NOT DETECTED
HIV 1+2 AB+HIV1 P24 AG SERPL QL IA: NORMAL
HIV 1+2 AB+HIV1 P24 AG SERPL QL IA: NORMAL
MITOCHONDRIA AB SER QL IF: NEGATIVE
N GONORRHOEA RRNA SPEC QL NAA+PROBE: NOT DETECTED
QUEST GC CT AMPLIFIED (ALWAYS MESSAGE): NORMAL
T PALLIDUM AB SER QL IA: NEGATIVE

## 2025-10-02 ENCOUNTER — APPOINTMENT (OUTPATIENT)
Dept: NEUROLOGY | Facility: CLINIC | Age: 45
End: 2025-10-02
Payer: COMMERCIAL

## 2025-12-01 ENCOUNTER — APPOINTMENT (OUTPATIENT)
Dept: PRIMARY CARE | Facility: CLINIC | Age: 45
End: 2025-12-01
Payer: COMMERCIAL

## 2026-05-04 ENCOUNTER — APPOINTMENT (OUTPATIENT)
Dept: DERMATOLOGY | Facility: CLINIC | Age: 46
End: 2026-05-04
Payer: COMMERCIAL

## (undated) DEVICE — NEEDLE, HYPODERMIC, SAFETYGLIDE, SHIELDING, REGULAR WALL, REGULAR BEVEL, 22 G X 1.5 IN, BLACK HUB

## (undated) DEVICE — DRAPE, SHEET, UTILITY, NON ABSORBENT, 18 X 26 IN, LF

## (undated) DEVICE — DEVICE, TENJET

## (undated) DEVICE — COVER, MAYO STAND, W/PAD, 23 IN, DISPOSABLE, PLASTIC, LF, STERILE

## (undated) DEVICE — NEEDLE, HYPODERMIC, SAFETY, GLIDE, 18G X 1.5"

## (undated) DEVICE — CUP, MEDICINE, GRADUATED, 2 OZ, PLASTIC, DISP, LF

## (undated) DEVICE — SLING, ARM, NARROW POUCH, LARGE